# Patient Record
Sex: FEMALE | Race: WHITE | NOT HISPANIC OR LATINO | Employment: OTHER | URBAN - METROPOLITAN AREA
[De-identification: names, ages, dates, MRNs, and addresses within clinical notes are randomized per-mention and may not be internally consistent; named-entity substitution may affect disease eponyms.]

---

## 2023-02-14 PROBLEM — M96.1 FAILED BACK SYNDROME OF LUMBAR SPINE: Status: ACTIVE | Noted: 2023-02-14

## 2023-02-14 PROBLEM — R25.2 MUSCLE CRAMPING: Status: ACTIVE | Noted: 2023-02-14

## 2023-02-14 PROBLEM — E03.9 HYPOTHYROIDISM: Status: ACTIVE | Noted: 2023-02-14

## 2023-02-14 PROBLEM — M51.369 OTHER INTERVERTEBRAL DISC DEGENERATION, LUMBAR REGION: Status: ACTIVE | Noted: 2023-02-14

## 2023-02-14 PROBLEM — R81 GLUCOSURIA: Status: ACTIVE | Noted: 2023-02-14

## 2023-02-14 PROBLEM — E11.40 DIABETES MELLITUS WITH DIABETIC NEUROPATHY (MULTI): Status: ACTIVE | Noted: 2023-02-14

## 2023-02-14 PROBLEM — M79.2 NEUROPATHIC PAIN OF LOWER EXTREMITY, LEFT: Status: ACTIVE | Noted: 2023-02-14

## 2023-02-14 PROBLEM — R42 LIGHTHEADED: Status: ACTIVE | Noted: 2023-02-14

## 2023-02-14 PROBLEM — M51.36 OTHER INTERVERTEBRAL DISC DEGENERATION, LUMBAR REGION: Status: ACTIVE | Noted: 2023-02-14

## 2023-02-14 PROBLEM — K25.9 GASTRIC ULCER: Status: ACTIVE | Noted: 2023-02-14

## 2023-02-14 PROBLEM — E83.42 HYPOMAGNESEMIA: Status: ACTIVE | Noted: 2023-02-14

## 2023-02-14 PROBLEM — M79.671 FOOT PAIN, BILATERAL: Status: ACTIVE | Noted: 2023-02-14

## 2023-02-14 PROBLEM — I10 HYPERTENSION: Status: ACTIVE | Noted: 2023-02-14

## 2023-02-14 PROBLEM — L60.0 IGTN (INGROWING TOE NAIL): Status: ACTIVE | Noted: 2023-02-14

## 2023-02-14 PROBLEM — E55.9 VITAMIN D DEFICIENCY: Status: ACTIVE | Noted: 2023-02-14

## 2023-02-14 PROBLEM — M48.061 SPINAL STENOSIS, LUMBAR: Status: ACTIVE | Noted: 2023-02-14

## 2023-02-14 PROBLEM — B35.1 ONYCHOMYCOSIS: Status: ACTIVE | Noted: 2023-02-14

## 2023-02-14 PROBLEM — F41.9 ANXIETY: Status: ACTIVE | Noted: 2023-02-14

## 2023-02-14 PROBLEM — M54.50 CHRONIC LOW BACK PAIN: Status: ACTIVE | Noted: 2023-02-14

## 2023-02-14 PROBLEM — R31.29 MICROSCOPIC HEMATURIA: Status: ACTIVE | Noted: 2023-02-14

## 2023-02-14 PROBLEM — N28.9 KIDNEY DYSFUNCTION: Status: ACTIVE | Noted: 2023-02-14

## 2023-02-14 PROBLEM — M54.31 SCIATICA OF RIGHT SIDE: Status: ACTIVE | Noted: 2023-02-14

## 2023-02-14 PROBLEM — N39.3 STRESS INCONTINENCE IN FEMALE: Status: ACTIVE | Noted: 2023-02-14

## 2023-02-14 PROBLEM — I65.23 CAROTID STENOSIS, BILATERAL: Status: ACTIVE | Noted: 2023-02-14

## 2023-02-14 PROBLEM — I95.9 HYPOTENSIVE EPISODE: Status: ACTIVE | Noted: 2023-02-14

## 2023-02-14 PROBLEM — R79.89 ELEVATED LFTS: Status: ACTIVE | Noted: 2023-02-14

## 2023-02-14 PROBLEM — M54.32 SCIATIC PAIN, LEFT: Status: ACTIVE | Noted: 2023-02-14

## 2023-02-14 PROBLEM — N18.30 STAGE III CHRONIC KIDNEY DISEASE (MULTI): Status: ACTIVE | Noted: 2023-02-14

## 2023-02-14 PROBLEM — R73.9 ELEVATED BLOOD SUGAR: Status: ACTIVE | Noted: 2023-02-14

## 2023-02-14 PROBLEM — G47.00 INSOMNIA: Status: ACTIVE | Noted: 2023-02-14

## 2023-02-14 PROBLEM — M53.3 SACROILIAC JOINT DYSFUNCTION OF RIGHT SIDE: Status: ACTIVE | Noted: 2023-02-14

## 2023-02-14 PROBLEM — H81.10 VERTIGO, BENIGN POSITIONAL: Status: ACTIVE | Noted: 2023-02-14

## 2023-02-14 PROBLEM — M43.16 SPONDYLOLISTHESIS OF LUMBAR REGION: Status: ACTIVE | Noted: 2023-02-14

## 2023-02-14 PROBLEM — E78.00 HYPERCHOLESTEROLEMIA: Status: ACTIVE | Noted: 2023-02-14

## 2023-02-14 PROBLEM — D64.9 POSTOPERATIVE ANEMIA: Status: ACTIVE | Noted: 2023-02-14

## 2023-02-14 PROBLEM — M54.16 LUMBAR RADICULITIS: Status: ACTIVE | Noted: 2023-02-14

## 2023-02-14 PROBLEM — R39.9 SYMPTOMS OF URINARY TRACT INFECTION: Status: ACTIVE | Noted: 2023-02-14

## 2023-02-14 PROBLEM — E11.42 DIABETIC POLYNEUROPATHY ASSOCIATED WITH TYPE 2 DIABETES MELLITUS (MULTI): Status: ACTIVE | Noted: 2023-02-14

## 2023-02-14 PROBLEM — M48.061 FORAMINAL STENOSIS OF LUMBAR REGION: Status: ACTIVE | Noted: 2023-02-14

## 2023-02-14 PROBLEM — I83.93 VARICOSE VEINS OF LEGS: Status: ACTIVE | Noted: 2023-02-14

## 2023-02-14 PROBLEM — I87.2 CHRONIC VENOUS INSUFFICIENCY: Status: ACTIVE | Noted: 2023-02-14

## 2023-02-14 PROBLEM — H90.3 BILATERAL SENSORINEURAL HEARING LOSS: Status: ACTIVE | Noted: 2023-02-14

## 2023-02-14 PROBLEM — G89.29 CHRONIC LOW BACK PAIN: Status: ACTIVE | Noted: 2023-02-14

## 2023-02-14 PROBLEM — R53.83 FATIGUE: Status: ACTIVE | Noted: 2023-02-14

## 2023-02-14 PROBLEM — M47.816 SPONDYLOSIS OF LUMBAR REGION WITHOUT MYELOPATHY OR RADICULOPATHY: Status: ACTIVE | Noted: 2023-02-14

## 2023-02-14 PROBLEM — M79.672 FOOT PAIN, BILATERAL: Status: ACTIVE | Noted: 2023-02-14

## 2023-02-14 PROBLEM — M81.0 OSTEOPOROSIS OF VERTEBRA: Status: ACTIVE | Noted: 2023-02-14

## 2023-02-14 PROBLEM — M79.606 LOWER EXTREMITY PAIN: Status: ACTIVE | Noted: 2023-02-14

## 2023-02-14 RX ORDER — HUMAN INSULIN 100 [USP'U]/ML
INJECTION, SUSPENSION SUBCUTANEOUS
COMMUNITY
Start: 2014-03-17 | End: 2023-07-27 | Stop reason: SDUPTHER

## 2023-02-14 RX ORDER — BENAZEPRIL HYDROCHLORIDE 10 MG/1
1 TABLET ORAL DAILY
COMMUNITY
Start: 2018-02-12 | End: 2023-06-06 | Stop reason: SDUPTHER

## 2023-02-14 RX ORDER — FENOFIBRATE 54 MG/1
1 TABLET ORAL DAILY
COMMUNITY
Start: 2014-08-20

## 2023-02-14 RX ORDER — PAROXETINE 30 MG/1
1 TABLET, FILM COATED ORAL DAILY
COMMUNITY
Start: 2013-06-19 | End: 2023-07-17

## 2023-02-14 RX ORDER — GLIMEPIRIDE 4 MG/1
1 TABLET ORAL 2 TIMES DAILY
COMMUNITY
Start: 2016-07-25 | End: 2023-12-27 | Stop reason: SDUPTHER

## 2023-02-14 RX ORDER — DEXTROMETHORPHAN HYDROBROMIDE, GUAIFENESIN 5; 100 MG/5ML; MG/5ML
650 LIQUID ORAL EVERY 8 HOURS PRN
COMMUNITY
Start: 2021-09-21

## 2023-02-14 RX ORDER — MULTIVIT WITH MINERALS/HERBS
1 TABLET ORAL DAILY
COMMUNITY

## 2023-02-14 RX ORDER — BLOOD SUGAR DIAGNOSTIC
STRIP MISCELLANEOUS
COMMUNITY
Start: 2020-06-16 | End: 2024-01-18 | Stop reason: SDUPTHER

## 2023-02-14 RX ORDER — ACETAMINOPHEN 500 MG
1 TABLET ORAL DAILY
COMMUNITY
Start: 2017-08-14

## 2023-03-20 ENCOUNTER — TELEPHONE (OUTPATIENT)
Dept: PRIMARY CARE | Facility: CLINIC | Age: 78
End: 2023-03-20
Payer: COMMERCIAL

## 2023-03-20 DIAGNOSIS — M48.062 SPINAL STENOSIS OF LUMBAR REGION WITH NEUROGENIC CLAUDICATION: ICD-10-CM

## 2023-03-20 DIAGNOSIS — M54.16 LUMBAR RADICULITIS: Primary | ICD-10-CM

## 2023-03-24 NOTE — TELEPHONE ENCOUNTER
Pt called back needing referral & prior auth.   To  [facility] for MRI    [facility] [phone]    Spoke w/ pt again.  I see mri for back but she  Does say her hip & back hurt.  She was never  To any doctor at [facility].  Please add to mri  ty       Spoke w/her gave number to [facility] & she is seeing Skylar Hassan   Next week.

## 2023-04-10 ENCOUNTER — OFFICE VISIT (OUTPATIENT)
Dept: PRIMARY CARE | Facility: CLINIC | Age: 78
End: 2023-04-10
Payer: COMMERCIAL

## 2023-04-10 VITALS
DIASTOLIC BLOOD PRESSURE: 68 MMHG | WEIGHT: 174.6 LBS | TEMPERATURE: 96.9 F | HEIGHT: 63 IN | HEART RATE: 57 BPM | BODY MASS INDEX: 30.94 KG/M2 | SYSTOLIC BLOOD PRESSURE: 120 MMHG | OXYGEN SATURATION: 96 %

## 2023-04-10 DIAGNOSIS — E11.40 TYPE 2 DIABETES MELLITUS WITH DIABETIC NEUROPATHY, WITHOUT LONG-TERM CURRENT USE OF INSULIN (MULTI): Primary | ICD-10-CM

## 2023-04-10 DIAGNOSIS — M48.061 FORAMINAL STENOSIS OF LUMBAR REGION: ICD-10-CM

## 2023-04-10 LAB — HBA1C MFR BLD: 7.5 % (ref 4.2–6.5)

## 2023-04-10 PROCEDURE — 83036 HEMOGLOBIN GLYCOSYLATED A1C: CPT | Performed by: FAMILY MEDICINE

## 2023-04-10 PROCEDURE — 3074F SYST BP LT 130 MM HG: CPT | Performed by: FAMILY MEDICINE

## 2023-04-10 PROCEDURE — 1159F MED LIST DOCD IN RCRD: CPT | Performed by: FAMILY MEDICINE

## 2023-04-10 PROCEDURE — 99214 OFFICE O/P EST MOD 30 MIN: CPT | Performed by: FAMILY MEDICINE

## 2023-04-10 PROCEDURE — 3078F DIAST BP <80 MM HG: CPT | Performed by: FAMILY MEDICINE

## 2023-04-10 PROCEDURE — 1036F TOBACCO NON-USER: CPT | Performed by: FAMILY MEDICINE

## 2023-04-10 RX ORDER — NAPROXEN 500 MG/1
500 TABLET ORAL EVERY 12 HOURS PRN
Qty: 60 TABLET | Refills: 3 | Status: SHIPPED | OUTPATIENT
Start: 2023-04-10 | End: 2023-05-10

## 2023-04-10 RX ORDER — TRAMADOL HYDROCHLORIDE 50 MG/1
TABLET ORAL
COMMUNITY
End: 2023-04-10 | Stop reason: SDUPTHER

## 2023-04-10 RX ORDER — TRAMADOL HYDROCHLORIDE 50 MG/1
50 TABLET ORAL 2 TIMES DAILY PRN
Qty: 60 TABLET | Refills: 0 | Status: SHIPPED | OUTPATIENT
Start: 2023-04-10 | End: 2023-12-13

## 2023-04-10 RX ORDER — NAPROXEN 500 MG/1
500 TABLET ORAL EVERY 12 HOURS PRN
COMMUNITY
Start: 2023-03-16 | End: 2023-04-10 | Stop reason: SDUPTHER

## 2023-04-10 RX ORDER — PREDNISONE 5 MG/1
5 TABLET ORAL DAILY
Qty: 7 TABLET | Refills: 0 | Status: SHIPPED | OUTPATIENT
Start: 2023-04-10 | End: 2023-07-17

## 2023-04-10 RX ORDER — METHOCARBAMOL 750 MG/1
750 TABLET, FILM COATED ORAL DAILY PRN
Qty: 30 TABLET | Refills: 1 | Status: SHIPPED | OUTPATIENT
Start: 2023-04-10 | End: 2023-07-17

## 2023-04-10 RX ORDER — PREDNISONE 5 MG/1
5 TABLET ORAL DAILY
COMMUNITY
Start: 2023-04-05 | End: 2023-04-10 | Stop reason: SDUPTHER

## 2023-04-10 RX ORDER — METHOCARBAMOL 750 MG/1
750 TABLET, FILM COATED ORAL 4 TIMES DAILY
COMMUNITY
End: 2023-04-10 | Stop reason: SDUPTHER

## 2023-04-10 ASSESSMENT — PAIN SCALES - GENERAL: PAINLEVEL: 6

## 2023-04-10 NOTE — PROGRESS NOTES
"Subjective   Patient ID: Skylar Parra is a 77 y.o. female who presents for Diabetes (Check sugar and review medications.).    HPI   Was unable to do PT due to pain.  Has MRI scheduled tomorrow.  Continues to have low back and leg pain  Blood sugars doing well.  They have been getting down to referral around 7.  She has drastically improved her diet and it is her weight down 50 pounds.  Review of Systems    Objective   /68 (BP Location: Left arm, Patient Position: Sitting, BP Cuff Size: Adult)   Pulse 57   Temp 36.1 °C (96.9 °F) (Temporal)   Ht 1.6 m (5' 3\")   Wt 79.2 kg (174 lb 9.6 oz)   SpO2 96%   BMI 30.93 kg/m²     Physical Exam  Alert, pleasant and in no acute distress.  Heart: Regular rate and rhythm without murmur  Lungs: Clear to auscultation  Lower extremities: No edema  Assessment/Plan   Problem List Items Addressed This Visit          Nervous    Diabetes mellitus with diabetic neuropathy (CMS/HCC) - Primary    Relevant Orders    POCT Glycosylated Hemoglobin (HGB A1C) docked device    Foraminal stenosis of lumbar region    Relevant Medications    methocarbamol (Robaxin) 750 mg tablet    traMADol (Ultram) 50 mg tablet    naproxen (Naprosyn) 500 mg tablet    predniSONE (Deltasone) 5 mg tablet     Patient has been throwing up the last month or 2.  She has a lot of low back and leg pain.  She has bounced around from doctors or to the emergency rooms.  She has an MRI ordered tomorrow.  We will keep current medication regimen which has been somewhat helpful.  Advised her to be careful for dizziness and drowsiness.  She is using each of the medication sparingly.  OARRS report was reviewed.  Referral to pain management.  Patient has seen them in the past and I recommended they see her within the next week so they can review her MRI and look at further treatment  For diabetes, advised her to continue weaning her insulin to avoid hypoglycemia.  Advised her to carry some sweet food products with her " regularly in case her sugars drop.  Commended her on her healthy diet  Follow-up in 3 months

## 2023-06-22 ENCOUNTER — TELEPHONE (OUTPATIENT)
Dept: PRIMARY CARE | Facility: CLINIC | Age: 78
End: 2023-06-22
Payer: COMMERCIAL

## 2023-06-22 DIAGNOSIS — F41.9 ANXIETY: Primary | ICD-10-CM

## 2023-06-26 RX ORDER — ESCITALOPRAM OXALATE 10 MG/1
10 TABLET ORAL DAILY
Qty: 30 TABLET | Refills: 5 | Status: SHIPPED | OUTPATIENT
Start: 2023-06-26 | End: 2023-07-19

## 2023-07-17 ENCOUNTER — OFFICE VISIT (OUTPATIENT)
Dept: PRIMARY CARE | Facility: CLINIC | Age: 78
End: 2023-07-17
Payer: COMMERCIAL

## 2023-07-17 ENCOUNTER — TELEPHONE (OUTPATIENT)
Dept: PRIMARY CARE | Facility: CLINIC | Age: 78
End: 2023-07-17

## 2023-07-17 VITALS
SYSTOLIC BLOOD PRESSURE: 130 MMHG | DIASTOLIC BLOOD PRESSURE: 76 MMHG | WEIGHT: 164.2 LBS | TEMPERATURE: 98 F | BODY MASS INDEX: 29.09 KG/M2 | HEIGHT: 63 IN

## 2023-07-17 DIAGNOSIS — E11.40 TYPE 2 DIABETES MELLITUS WITH DIABETIC NEUROPATHY, WITHOUT LONG-TERM CURRENT USE OF INSULIN (MULTI): Primary | ICD-10-CM

## 2023-07-17 DIAGNOSIS — S50.812A ABRASION OF LEFT FOREARM, INITIAL ENCOUNTER: ICD-10-CM

## 2023-07-17 DIAGNOSIS — R29.6 MULTIPLE FALLS: ICD-10-CM

## 2023-07-17 PROBLEM — M96.1 POSTLAMINECTOMY SYNDROME OF LUMBAR REGION: Status: ACTIVE | Noted: 2023-07-17

## 2023-07-17 PROBLEM — N18.2 STAGE 2 CHRONIC KIDNEY DISEASE: Status: ACTIVE | Noted: 2023-07-17

## 2023-07-17 PROBLEM — E16.2 HYPOGLYCEMIA: Status: ACTIVE | Noted: 2019-08-27

## 2023-07-17 PROBLEM — I10 ESSENTIAL HYPERTENSION: Status: ACTIVE | Noted: 2023-07-17

## 2023-07-17 PROBLEM — Z86.39 HISTORY OF HYPERCHOLESTEROLEMIA: Status: ACTIVE | Noted: 2023-07-17

## 2023-07-17 PROBLEM — E11.9 TYPE 2 DIABETES MELLITUS (MULTI): Status: ACTIVE | Noted: 2023-07-17

## 2023-07-17 PROCEDURE — 1125F AMNT PAIN NOTED PAIN PRSNT: CPT | Performed by: FAMILY MEDICINE

## 2023-07-17 PROCEDURE — 3075F SYST BP GE 130 - 139MM HG: CPT | Performed by: FAMILY MEDICINE

## 2023-07-17 PROCEDURE — 1036F TOBACCO NON-USER: CPT | Performed by: FAMILY MEDICINE

## 2023-07-17 PROCEDURE — 1159F MED LIST DOCD IN RCRD: CPT | Performed by: FAMILY MEDICINE

## 2023-07-17 PROCEDURE — 3078F DIAST BP <80 MM HG: CPT | Performed by: FAMILY MEDICINE

## 2023-07-17 PROCEDURE — 99214 OFFICE O/P EST MOD 30 MIN: CPT | Performed by: FAMILY MEDICINE

## 2023-07-17 RX ORDER — INSULIN GLARGINE 100 [IU]/ML
20-30 INJECTION, SOLUTION SUBCUTANEOUS NIGHTLY
Qty: 10 ML | Refills: 11 | Status: SHIPPED | OUTPATIENT
Start: 2023-07-17 | End: 2023-07-18

## 2023-07-17 RX ORDER — CALCIUM CARBONATE 300MG(750)
400 TABLET,CHEWABLE ORAL DAILY
COMMUNITY

## 2023-07-17 ASSESSMENT — PAIN SCALES - GENERAL: PAINLEVEL: 4

## 2023-07-17 NOTE — TELEPHONE ENCOUNTER
She will check about levemir but is very frustrated with these insurances.  She will continue taking what she is on.  Ty

## 2023-07-17 NOTE — TELEPHONE ENCOUNTER
She said they will accept the levemir for a month at a time.  Could you please prescribe this med to [facility] [phone]  Not the mail order.  Please advise

## 2023-07-17 NOTE — TELEPHONE ENCOUNTER
Pts insurance will not cover her insulin glargine (lantus) 100 units.    Is there anything else she can take?  She has [facility].  Please advise

## 2023-07-18 RX ORDER — INSULIN DETEMIR 100 [IU]/ML
20-30 INJECTION, SOLUTION SUBCUTANEOUS NIGHTLY
Qty: 10 ML | Refills: 11 | Status: SHIPPED | OUTPATIENT
Start: 2023-07-18 | End: 2023-07-27 | Stop reason: SDUPTHER

## 2023-07-18 NOTE — PROGRESS NOTES
"Subjective   Patient ID: Skylar Parra is a 78 y.o. female who presents for Follow-up (3 month follow up and review medications. ), Leg Pain (Leg pain and difficulty to walk. Falling concerns. ), and Wound Infection (Wound infected on left arm. ).    HPI   As noted in chief complaint.  Patient has had a lot of musculoskeletal problems along with falls.  She has an abrasion on her left arm.  Blood sugars have been a bit sporadic.  Review of Systems    Objective   /76 (BP Location: Left arm, Patient Position: Sitting, BP Cuff Size: Adult)   Temp 36.7 °C (98 °F)   Ht 1.6 m (5' 3\")   Wt 74.5 kg (164 lb 3.2 oz)   BMI 29.09 kg/m²     Physical Exam  Alert, pleasant and in no acute distress.  Heart: Regular rate and rhythm without murmur  Lungs: Clear to auscultation  Lower extremities: No edema  Left forearm: Rectangular shaped 1 inch superficial abrasion.  No sign of infection.  There is no pus or erythema.  Assessment/Plan   Problem List Items Addressed This Visit          Endocrine/Metabolic    Type 2 diabetes mellitus (CMS/HCC) - Primary    Relevant Medications    insulin glargine (Lantus) 100 unit/mL injection     Other Visit Diagnoses       Abrasion of left forearm, initial encounter        Multiple falls            Patient here with multiple falls.  She is not interested in physical therapy at this time.  We discussed continuing specialty follow-up for her pain.  For her diabetes, we are going to start her on long-acting insulin to try to help smooth her numbers.  We will lower her nonfluent 70/30 to 15 units twice a day and have her start with 20 units of insulin glargine daily.  Abrasion of forearm: Advised patient to clean her wound daily with mild soapy water.  She is to apply some Vaseline along with a nonadherent dressing.  Dressing of the wound was undertaken in the office to explain and teach her how to care for it.  Follow-up in 3 months, sooner if having problems.       "

## 2023-07-19 DIAGNOSIS — F41.9 ANXIETY: ICD-10-CM

## 2023-07-19 RX ORDER — NAPROXEN 500 MG/1
TABLET ORAL
COMMUNITY
Start: 2023-07-18 | End: 2023-12-13

## 2023-07-19 RX ORDER — ESCITALOPRAM OXALATE 10 MG/1
TABLET ORAL
Qty: 30 TABLET | Refills: 5 | Status: SHIPPED | OUTPATIENT
Start: 2023-07-19 | End: 2023-12-18

## 2023-07-21 ENCOUNTER — TELEPHONE (OUTPATIENT)
Dept: PRIMARY CARE | Facility: CLINIC | Age: 78
End: 2023-07-21
Payer: MEDICAID

## 2023-07-21 NOTE — TELEPHONE ENCOUNTER
Pt has bed sore-she is coming in for annual 8/9.  Could she be squeezed in  Monday?  Cannot get here between the 25-29.  Please advise.

## 2023-07-24 ENCOUNTER — OFFICE VISIT (OUTPATIENT)
Dept: PRIMARY CARE | Facility: CLINIC | Age: 78
End: 2023-07-24
Payer: COMMERCIAL

## 2023-07-24 VITALS
BODY MASS INDEX: 29.62 KG/M2 | DIASTOLIC BLOOD PRESSURE: 68 MMHG | WEIGHT: 167.2 LBS | HEIGHT: 63 IN | TEMPERATURE: 97.1 F | HEART RATE: 113 BPM | OXYGEN SATURATION: 93 % | SYSTOLIC BLOOD PRESSURE: 118 MMHG

## 2023-07-24 DIAGNOSIS — L89.90: ICD-10-CM

## 2023-07-24 DIAGNOSIS — Z79.4 TYPE 2 DIABETES MELLITUS WITHOUT COMPLICATION, WITH LONG-TERM CURRENT USE OF INSULIN (MULTI): Primary | ICD-10-CM

## 2023-07-24 DIAGNOSIS — L98.499: ICD-10-CM

## 2023-07-24 DIAGNOSIS — E11.9 TYPE 2 DIABETES MELLITUS WITHOUT COMPLICATION, WITH LONG-TERM CURRENT USE OF INSULIN (MULTI): Primary | ICD-10-CM

## 2023-07-24 PROBLEM — I95.9 HYPOTENSIVE EPISODE: Status: RESOLVED | Noted: 2023-02-14 | Resolved: 2023-07-24

## 2023-07-24 PROBLEM — I10 HYPERTENSION: Status: RESOLVED | Noted: 2023-02-14 | Resolved: 2023-07-24

## 2023-07-24 PROCEDURE — 1125F AMNT PAIN NOTED PAIN PRSNT: CPT | Performed by: FAMILY MEDICINE

## 2023-07-24 PROCEDURE — 3074F SYST BP LT 130 MM HG: CPT | Performed by: FAMILY MEDICINE

## 2023-07-24 PROCEDURE — 99214 OFFICE O/P EST MOD 30 MIN: CPT | Performed by: FAMILY MEDICINE

## 2023-07-24 PROCEDURE — 3078F DIAST BP <80 MM HG: CPT | Performed by: FAMILY MEDICINE

## 2023-07-24 PROCEDURE — 1159F MED LIST DOCD IN RCRD: CPT | Performed by: FAMILY MEDICINE

## 2023-07-24 PROCEDURE — 1036F TOBACCO NON-USER: CPT | Performed by: FAMILY MEDICINE

## 2023-07-24 ASSESSMENT — PAIN SCALES - GENERAL: PAINLEVEL: 6

## 2023-07-24 NOTE — PROGRESS NOTES
"Subjective   Patient ID: Skylar Parra is a 78 y.o. female who presents for Follow-up (Sore on tailbone for 1 week. ) and Wound Check (Check wound on left arm).    HPI   Patient here today with her sister.  She is planning to go into an assisted living facility this week.  They need forms completed.  Patient also has 2 wounds she would like looked at.  One on her left forearm and 1 on her gluteal region.  Patient's blood sugars have been doing better on the Levemir.  Advised patient to cut back on her Novolin since her sugars have been dipping lower.  Review of Systems    Objective   /68 (BP Location: Left arm, Patient Position: Sitting, BP Cuff Size: Adult)   Pulse (!) 113   Temp 36.2 °C (97.1 °F) (Temporal)   Ht 1.6 m (5' 3\")   Wt 75.8 kg (167 lb 3.2 oz)   SpO2 93%   BMI 29.62 kg/m²     Physical Exam  Alert, pleasant and in no acute distress.  Heart: Regular rate and rhythm without murmur  Lungs: Clear to auscultation  Lower extremities: Trace edema.  Left gluteal cleft with 1.5 inch area of superficial ulceration.  Appears clean and without infection.  Left forearm: 1 inch area of denuded skin appears to be healing.  No sign of infection.  Assessment/Plan   Problem List Items Addressed This Visit          Endocrine/Metabolic    Type 2 diabetes mellitus (CMS/HCC) - Primary     Other Visit Diagnoses       Superficial ulcer (CMS/HCC)        Superficial pressure ulcer            Patient here for follow-up.  She has 2 ulcers.  The one on her arm looks like it is healing well.  Unfortunately, she has been sleeping in a chair and this is causing an ulcer on her gluteal region.  This 1 appears to be clean and dry.  We will have a wound care nurse start to help her manage at the nursing home.  She is to continue on her Levemir but we will have her cut back on her novel and by 10 units to 20 units twice daily.  Follow-up in 1 month.  Forms completed for assisted living       "

## 2023-07-25 ENCOUNTER — TELEPHONE (OUTPATIENT)
Dept: PRIMARY CARE | Facility: CLINIC | Age: 78
End: 2023-07-25
Payer: MEDICAID

## 2023-07-25 NOTE — TELEPHONE ENCOUNTER
[facility] needed referral for pt & ot & wound nurse consult for pt.  She just had visit recently.  [id number]-[phone]  [id number]-[phone]  Wants to move in on Thursday.  Please advise  Ty

## 2023-07-25 NOTE — TELEPHONE ENCOUNTER
[facility] needs detailed list of meds/quantity taken-often.  She was  Just in yesterday.  [id number]-[phone]  She will be ok with just a call

## 2023-07-27 ENCOUNTER — TELEPHONE (OUTPATIENT)
Dept: PRIMARY CARE | Facility: CLINIC | Age: 78
End: 2023-07-27
Payer: MEDICAID

## 2023-07-27 DIAGNOSIS — E11.40 TYPE 2 DIABETES MELLITUS WITH DIABETIC NEUROPATHY, WITHOUT LONG-TERM CURRENT USE OF INSULIN (MULTI): ICD-10-CM

## 2023-07-27 RX ORDER — HUMAN INSULIN 100 [USP'U]/ML
15 INJECTION, SUSPENSION SUBCUTANEOUS
Qty: 10 ML | Refills: 3 | Status: SHIPPED | OUTPATIENT
Start: 2023-07-27 | End: 2023-12-13 | Stop reason: SDUPTHER

## 2023-07-27 RX ORDER — INSULIN DETEMIR 100 [IU]/ML
20 INJECTION, SOLUTION SUBCUTANEOUS NIGHTLY
Qty: 10 ML | Refills: 11 | Status: SHIPPED | OUTPATIENT
Start: 2023-07-27 | End: 2023-08-23 | Stop reason: SDUPTHER

## 2023-07-27 NOTE — TELEPHONE ENCOUNTER
[facility] called asking for calcification, which insulin is the patient supposed  to take Lotensin or Novolin?  Also the directions would need to be changed because they cannot do 20 - 30 units. There needs to be an exact number or sliding scale

## 2023-07-27 NOTE — TELEPHONE ENCOUNTER
Pt called and states that she is going into assistant living today and would like to thank you for everything you have done for her.

## 2023-08-09 ENCOUNTER — APPOINTMENT (OUTPATIENT)
Dept: PRIMARY CARE | Facility: CLINIC | Age: 78
End: 2023-08-09
Payer: MEDICAID

## 2023-08-22 DIAGNOSIS — E11.40 TYPE 2 DIABETES MELLITUS WITH DIABETIC NEUROPATHY, WITHOUT LONG-TERM CURRENT USE OF INSULIN (MULTI): ICD-10-CM

## 2023-08-22 NOTE — TELEPHONE ENCOUNTER
Pt. Called & said dr. Pavon cancelled her rx for levemir 100 units ml once daily to  [phone]  Can you refill please?  She is out  Ty     [___] : [unfilled]

## 2023-08-23 RX ORDER — INSULIN DETEMIR 100 [IU]/ML
20 INJECTION, SOLUTION SUBCUTANEOUS NIGHTLY
Qty: 10 ML | Refills: 11 | Status: SHIPPED | OUTPATIENT
Start: 2023-08-23 | End: 2023-12-13 | Stop reason: ALTCHOICE

## 2023-08-28 ENCOUNTER — APPOINTMENT (OUTPATIENT)
Dept: PRIMARY CARE | Facility: CLINIC | Age: 78
End: 2023-08-28
Payer: MEDICAID

## 2023-09-07 NOTE — TELEPHONE ENCOUNTER
Called pt and left vm regarding rx sent to pharmacy.    You can access the FollowMyHealth Patient Portal offered by [facility] by registering at the following website: [email or web address]. By joining [facility]’s FollowMyHealth portal, you will also be able to view your health information using other applications (apps) compatible with our system.

## 2023-10-09 ENCOUNTER — TELEPHONE (OUTPATIENT)
Dept: PRIMARY CARE | Facility: CLINIC | Age: 78
End: 2023-10-09
Payer: MEDICAID

## 2023-10-09 NOTE — TELEPHONE ENCOUNTER
Spoke with Colin and he states that the pt is taking 15 units of the Novolin twice daily and he just wants clarification about you wanting the pt to take 70 units twice daily. They feel that,that seems a little high from what she is taking now

## 2023-10-09 NOTE — TELEPHONE ENCOUNTER
[facility] called and states that the pt sugar went to 29-30 yesterday and they need new insulin orders done and would like to know if a nurse can do them due to they believe that the pt is not doing it herself. Pharmacy is on file. If you need to give them a call the phone number is [phone] ask for bill

## 2023-12-13 ENCOUNTER — OFFICE VISIT (OUTPATIENT)
Dept: PRIMARY CARE | Facility: CLINIC | Age: 78
End: 2023-12-13
Payer: COMMERCIAL

## 2023-12-13 VITALS
TEMPERATURE: 96.9 F | SYSTOLIC BLOOD PRESSURE: 124 MMHG | HEART RATE: 74 BPM | OXYGEN SATURATION: 98 % | DIASTOLIC BLOOD PRESSURE: 72 MMHG | WEIGHT: 170.4 LBS | BODY MASS INDEX: 30.19 KG/M2

## 2023-12-13 DIAGNOSIS — E03.9 HYPOTHYROIDISM, UNSPECIFIED TYPE: ICD-10-CM

## 2023-12-13 DIAGNOSIS — E11.40 TYPE 2 DIABETES MELLITUS WITH DIABETIC NEUROPATHY, WITHOUT LONG-TERM CURRENT USE OF INSULIN (MULTI): ICD-10-CM

## 2023-12-13 DIAGNOSIS — Z00.00 ANNUAL PHYSICAL EXAM: Primary | ICD-10-CM

## 2023-12-13 DIAGNOSIS — Z11.59 ENCOUNTER FOR HEPATITIS C SCREENING TEST FOR LOW RISK PATIENT: ICD-10-CM

## 2023-12-13 DIAGNOSIS — Z13.820 OSTEOPOROSIS SCREENING: ICD-10-CM

## 2023-12-13 DIAGNOSIS — S22.000A WEDGE COMPRESSION FRACTURE OF UNSPECIFIED THORACIC VERTEBRA, INITIAL ENCOUNTER FOR CLOSED FRACTURE (MULTI): ICD-10-CM

## 2023-12-13 DIAGNOSIS — E78.00 HYPERCHOLESTEROLEMIA: ICD-10-CM

## 2023-12-13 LAB
ALBUMIN SERPL BCP-MCNC: 4.4 G/DL (ref 3.4–5)
ALP SERPL-CCNC: 48 U/L (ref 33–136)
ALT SERPL W P-5'-P-CCNC: 15 U/L (ref 7–45)
ANION GAP SERPL CALC-SCNC: 16 MMOL/L (ref 10–20)
AST SERPL W P-5'-P-CCNC: 20 U/L (ref 9–39)
BILIRUB SERPL-MCNC: 0.3 MG/DL (ref 0–1.2)
BUN SERPL-MCNC: 26 MG/DL (ref 6–23)
CALCIUM SERPL-MCNC: 10.1 MG/DL (ref 8.6–10.6)
CHLORIDE SERPL-SCNC: 96 MMOL/L (ref 98–107)
CHOLEST SERPL-MCNC: 201 MG/DL (ref 0–199)
CHOLESTEROL/HDL RATIO: 3.3
CO2 SERPL-SCNC: 27 MMOL/L (ref 21–32)
CREAT SERPL-MCNC: 1.13 MG/DL (ref 0.5–1.05)
ERYTHROCYTE [DISTWIDTH] IN BLOOD BY AUTOMATED COUNT: 13.3 % (ref 11.5–14.5)
EST. AVERAGE GLUCOSE BLD GHB EST-MCNC: 203 MG/DL
GFR SERPL CREATININE-BSD FRML MDRD: 50 ML/MIN/1.73M*2
GLUCOSE SERPL-MCNC: 329 MG/DL (ref 74–99)
HBA1C MFR BLD: 8.7 %
HCT VFR BLD AUTO: 40.6 % (ref 36–46)
HCV AB SER QL: NONREACTIVE
HDLC SERPL-MCNC: 61.2 MG/DL
HGB BLD-MCNC: 12.9 G/DL (ref 12–16)
LDLC SERPL CALC-MCNC: 117 MG/DL
MCH RBC QN AUTO: 29 PG (ref 26–34)
MCHC RBC AUTO-ENTMCNC: 31.8 G/DL (ref 32–36)
MCV RBC AUTO: 91 FL (ref 80–100)
NON HDL CHOLESTEROL: 140 MG/DL (ref 0–149)
NRBC BLD-RTO: 0 /100 WBCS (ref 0–0)
PLATELET # BLD AUTO: 305 X10*3/UL (ref 150–450)
POTASSIUM SERPL-SCNC: 5.1 MMOL/L (ref 3.5–5.3)
PROT SERPL-MCNC: 6.6 G/DL (ref 6.4–8.2)
RBC # BLD AUTO: 4.45 X10*6/UL (ref 4–5.2)
SODIUM SERPL-SCNC: 134 MMOL/L (ref 136–145)
TRIGL SERPL-MCNC: 116 MG/DL (ref 0–149)
TSH SERPL-ACNC: 5.09 MIU/L (ref 0.44–3.98)
VLDL: 23 MG/DL (ref 0–40)
WBC # BLD AUTO: 11.4 X10*3/UL (ref 4.4–11.3)

## 2023-12-13 PROCEDURE — 84439 ASSAY OF FREE THYROXINE: CPT

## 2023-12-13 PROCEDURE — 86803 HEPATITIS C AB TEST: CPT

## 2023-12-13 PROCEDURE — 84443 ASSAY THYROID STIM HORMONE: CPT

## 2023-12-13 PROCEDURE — 83036 HEMOGLOBIN GLYCOSYLATED A1C: CPT

## 2023-12-13 PROCEDURE — 1036F TOBACCO NON-USER: CPT | Performed by: FAMILY MEDICINE

## 2023-12-13 PROCEDURE — 85027 COMPLETE CBC AUTOMATED: CPT

## 2023-12-13 PROCEDURE — 1126F AMNT PAIN NOTED NONE PRSNT: CPT | Performed by: FAMILY MEDICINE

## 2023-12-13 PROCEDURE — G0439 PPPS, SUBSEQ VISIT: HCPCS | Performed by: FAMILY MEDICINE

## 2023-12-13 PROCEDURE — 1159F MED LIST DOCD IN RCRD: CPT | Performed by: FAMILY MEDICINE

## 2023-12-13 PROCEDURE — 36415 COLL VENOUS BLD VENIPUNCTURE: CPT

## 2023-12-13 PROCEDURE — 99213 OFFICE O/P EST LOW 20 MIN: CPT | Performed by: FAMILY MEDICINE

## 2023-12-13 PROCEDURE — 80061 LIPID PANEL: CPT

## 2023-12-13 PROCEDURE — 1170F FXNL STATUS ASSESSED: CPT | Performed by: FAMILY MEDICINE

## 2023-12-13 PROCEDURE — 80053 COMPREHEN METABOLIC PANEL: CPT

## 2023-12-13 PROCEDURE — 1160F RVW MEDS BY RX/DR IN RCRD: CPT | Performed by: FAMILY MEDICINE

## 2023-12-13 PROCEDURE — 3074F SYST BP LT 130 MM HG: CPT | Performed by: FAMILY MEDICINE

## 2023-12-13 PROCEDURE — 3078F DIAST BP <80 MM HG: CPT | Performed by: FAMILY MEDICINE

## 2023-12-13 RX ORDER — GUAIFENESIN 100 MG/5ML
200 SOLUTION ORAL 3 TIMES DAILY PRN
COMMUNITY

## 2023-12-13 RX ORDER — HUMAN INSULIN 100 [USP'U]/ML
INJECTION, SUSPENSION SUBCUTANEOUS
Qty: 30 ML | Refills: 3 | Status: SHIPPED | OUTPATIENT
Start: 2023-12-13 | End: 2023-12-15 | Stop reason: DRUGHIGH

## 2023-12-13 RX ORDER — DIPHENHYDRAMINE HCL 25 MG
25 TABLET ORAL NIGHTLY PRN
COMMUNITY

## 2023-12-13 ASSESSMENT — PATIENT HEALTH QUESTIONNAIRE - PHQ9
2. FEELING DOWN, DEPRESSED OR HOPELESS: NOT AT ALL
1. LITTLE INTEREST OR PLEASURE IN DOING THINGS: NOT AT ALL
SUM OF ALL RESPONSES TO PHQ9 QUESTIONS 1 AND 2: 0

## 2023-12-13 ASSESSMENT — ACTIVITIES OF DAILY LIVING (ADL)
GROCERY_SHOPPING: NEEDS ASSISTANCE
MANAGING_FINANCES: NEEDS ASSISTANCE
TAKING_MEDICATION: NEEDS ASSISTANCE
DRESSING: INDEPENDENT
DOING_HOUSEWORK: TOTAL CARE
BATHING: INDEPENDENT

## 2023-12-13 ASSESSMENT — ENCOUNTER SYMPTOMS
OCCASIONAL FEELINGS OF UNSTEADINESS: 1
LOSS OF SENSATION IN FEET: 0
DEPRESSION: 0

## 2023-12-13 ASSESSMENT — PAIN SCALES - GENERAL: PAINLEVEL: 0-NO PAIN

## 2023-12-13 NOTE — PROGRESS NOTES
Subjective   Patient ID: Skylar Parra is a 78 y.o. female who presents for No chief complaint on file..    HPI   Patient here for checkup and Medicare wellness.  Blood sugar logs show somewhat sporadic readings.  Ranges from approximately 100-250.     Review of Systems    Objective   /72 (BP Location: Left arm, Patient Position: Sitting, BP Cuff Size: Adult)   Pulse 74   Temp 36.1 °C (96.9 °F) (Temporal)   Wt 77.3 kg (170 lb 6.4 oz)   SpO2 98%   BMI 30.19 kg/m²     Physical Exam  Alert, pleasant and in no acute distress.  Heart: Regular rate and rhythm without murmur  Lungs: Clear to auscultation  Lower extremities: No edema    Assessment/Plan   Problem List Items Addressed This Visit             ICD-10-CM       Cardiac and Vasculature    Hypercholesterolemia E78.00    Relevant Orders    Lipid Panel       Endocrine/Metabolic    Hypothyroidism E03.9    Relevant Orders    TSH with reflex to Free T4 if abnormal    Type 2 diabetes mellitus (CMS/[facility]) E11.9    Relevant Medications    insulin NPH and regular human (NovoLIN 70/30 U-100 Insulin) 100 unit/mL (70-30) injection    Other Relevant Orders    Comprehensive Metabolic Panel    CBC    Lipid Panel    Hemoglobin A1c     Other Visit Diagnoses         Codes    Annual physical exam    -  Primary Z00.00    Encounter for hepatitis C screening test for low risk patient     Z11.59    Relevant Orders    Hepatitis C antibody    Osteoporosis screening     Z13.820    Relevant Orders    XR DEXA bone density    Wedge compression fracture of unspecified thoracic vertebra, initial encounter for closed fracture (CMS/[facility])     S22.000A    Relevant Orders    XR DEXA bone density          1.  Health maintenance: Care gaps addressed  2.  Type 2 diabetes mellitus: Fair control.  We will bump up her morning insulin by 5 units.  Await hemoglobin A1c.  3.  Hypercholesterolemia: Lipid panel  4.  Hypothyroidism: Recheck thyroid level

## 2023-12-14 LAB — T4 FREE SERPL-MCNC: 0.86 NG/DL (ref 0.78–1.48)

## 2023-12-15 DIAGNOSIS — E11.40 TYPE 2 DIABETES MELLITUS WITH DIABETIC NEUROPATHY, WITHOUT LONG-TERM CURRENT USE OF INSULIN (MULTI): ICD-10-CM

## 2023-12-15 RX ORDER — HUMAN INSULIN 100 [USP'U]/ML
INJECTION, SUSPENSION SUBCUTANEOUS
Qty: 30 ML | Refills: 3 | Status: SHIPPED | OUTPATIENT
Start: 2023-12-15

## 2023-12-15 RX ORDER — INSULIN DETEMIR 100 [IU]/ML
20 INJECTION, SOLUTION SUBCUTANEOUS NIGHTLY
Qty: 10 ML | Refills: 11 | Status: SHIPPED | OUTPATIENT
Start: 2023-12-15 | End: 2024-01-22 | Stop reason: SDUPTHER

## 2023-12-15 RX ORDER — HUMAN INSULIN 100 [USP'U]/ML
INJECTION, SUSPENSION SUBCUTANEOUS
Qty: 30 ML | Refills: 3 | Status: SHIPPED | OUTPATIENT
Start: 2023-12-15 | End: 2023-12-15 | Stop reason: SDUPTHER

## 2023-12-15 NOTE — PROGRESS NOTES
Spoke with nurse at [facility].  Levemir insulin at 20 units nightly  Novolin 70/30 insulin at 15 units twice daily

## 2023-12-15 NOTE — LETTER
December 15, 2023     Brennon Sullivan DO  [address]  [address]  [place] [facility] [address]    Patient: Skylar Parra   YOB: 1945   Date of Visit: 12/15/2023       [facility] caret[facility],    Ms. Skylar Parra will now be on 20 units of long acting insulin (Levemir) nightly along with the 70/30 insulin (Novolin) at 15 units twice daily.  If you have any questions please feel free to contact my office.           Sincerely,     Brennon Sullivan DO      CC: No Recipients  ______________________________________________________________________________________

## 2023-12-18 DIAGNOSIS — F41.9 ANXIETY: ICD-10-CM

## 2023-12-18 RX ORDER — ESCITALOPRAM OXALATE 10 MG/1
10 TABLET ORAL DAILY
Qty: 90 TABLET | Refills: 3 | Status: SHIPPED | OUTPATIENT
Start: 2023-12-18 | End: 2024-12-17

## 2023-12-27 DIAGNOSIS — E11.40 TYPE 2 DIABETES MELLITUS WITH DIABETIC NEUROPATHY, WITHOUT LONG-TERM CURRENT USE OF INSULIN (MULTI): Primary | ICD-10-CM

## 2023-12-27 RX ORDER — GLIMEPIRIDE 4 MG/1
4 TABLET ORAL 2 TIMES DAILY
Qty: 180 TABLET | Refills: 3 | Status: SHIPPED | OUTPATIENT
Start: 2023-12-27

## 2024-01-17 ENCOUNTER — APPOINTMENT (OUTPATIENT)
Dept: PRIMARY CARE | Facility: CLINIC | Age: 79
End: 2024-01-17
Payer: MEDICAID

## 2024-01-18 DIAGNOSIS — E11.40 TYPE 2 DIABETES MELLITUS WITH DIABETIC NEUROPATHY, WITHOUT LONG-TERM CURRENT USE OF INSULIN (MULTI): Primary | ICD-10-CM

## 2024-01-18 RX ORDER — BLOOD SUGAR DIAGNOSTIC
STRIP MISCELLANEOUS
Qty: 200 STRIP | Refills: 3 | Status: SHIPPED | OUTPATIENT
Start: 2024-01-18

## 2024-01-22 DIAGNOSIS — E11.40 TYPE 2 DIABETES MELLITUS WITH DIABETIC NEUROPATHY, WITHOUT LONG-TERM CURRENT USE OF INSULIN (MULTI): ICD-10-CM

## 2024-01-22 RX ORDER — INSULIN DETEMIR 100 [IU]/ML
20 INJECTION, SOLUTION SUBCUTANEOUS NIGHTLY
Qty: 30 ML | Refills: 3 | Status: SHIPPED | OUTPATIENT
Start: 2024-01-22 | End: 2025-01-21

## 2024-01-22 NOTE — TELEPHONE ENCOUNTER
Pt is requesting refill for insulin detemir (Levemir U-100 Insulin) 100 unit/mL injection to be sent to pharmacy.

## 2024-02-13 ENCOUNTER — HOSPITAL ENCOUNTER (OUTPATIENT)
Dept: RADIOLOGY | Facility: CLINIC | Age: 79
Discharge: HOME | End: 2024-02-13
Payer: COMMERCIAL

## 2024-02-13 DIAGNOSIS — Z13.820 OSTEOPOROSIS SCREENING: ICD-10-CM

## 2024-02-13 DIAGNOSIS — S22.000A WEDGE COMPRESSION FRACTURE OF UNSPECIFIED THORACIC VERTEBRA, INITIAL ENCOUNTER FOR CLOSED FRACTURE (MULTI): ICD-10-CM

## 2024-02-13 PROCEDURE — 77080 DXA BONE DENSITY AXIAL: CPT

## 2024-02-13 PROCEDURE — 77080 DXA BONE DENSITY AXIAL: CPT | Performed by: STUDENT IN AN ORGANIZED HEALTH CARE EDUCATION/TRAINING PROGRAM

## 2024-02-27 ENCOUNTER — TELEPHONE (OUTPATIENT)
Dept: PRIMARY CARE | Facility: CLINIC | Age: 79
End: 2024-02-27
Payer: MEDICAID

## 2024-02-27 NOTE — TELEPHONE ENCOUNTER
[facility] and states that the pt is running low on her Levemir and her insurance will not cover it but they are sending over insulins that they do

## 2024-03-04 ENCOUNTER — TELEPHONE (OUTPATIENT)
Dept: PRIMARY CARE | Facility: CLINIC | Age: 79
End: 2024-03-04
Payer: COMMERCIAL

## 2024-03-04 ENCOUNTER — HOSPITAL ENCOUNTER (EMERGENCY)
Facility: HOSPITAL | Age: 79
Discharge: HOME | End: 2024-03-04
Attending: EMERGENCY MEDICINE
Payer: COMMERCIAL

## 2024-03-04 ENCOUNTER — APPOINTMENT (OUTPATIENT)
Dept: CARDIOLOGY | Facility: HOSPITAL | Age: 79
End: 2024-03-04
Payer: COMMERCIAL

## 2024-03-04 VITALS
RESPIRATION RATE: 16 BRPM | HEIGHT: 63 IN | SYSTOLIC BLOOD PRESSURE: 149 MMHG | TEMPERATURE: 98.6 F | DIASTOLIC BLOOD PRESSURE: 69 MMHG | HEART RATE: 68 BPM | OXYGEN SATURATION: 100 % | WEIGHT: 171.3 LBS | BODY MASS INDEX: 30.35 KG/M2

## 2024-03-04 DIAGNOSIS — E11.40 TYPE 2 DIABETES MELLITUS WITH DIABETIC NEUROPATHY, WITHOUT LONG-TERM CURRENT USE OF INSULIN (MULTI): Primary | ICD-10-CM

## 2024-03-04 DIAGNOSIS — R73.9 HYPERGLYCEMIA: Primary | ICD-10-CM

## 2024-03-04 LAB
ALBUMIN SERPL BCP-MCNC: 3.9 G/DL (ref 3.4–5)
ALP SERPL-CCNC: 47 U/L (ref 33–136)
ALT SERPL W P-5'-P-CCNC: 13 U/L (ref 7–45)
ANION GAP BLDV CALCULATED.4IONS-SCNC: 10 MMOL/L (ref 10–25)
ANION GAP SERPL CALC-SCNC: 11 MMOL/L (ref 10–20)
APPEARANCE UR: ABNORMAL
AST SERPL W P-5'-P-CCNC: 17 U/L (ref 9–39)
BASE EXCESS BLDV CALC-SCNC: -0.1 MMOL/L (ref -2–3)
BASOPHILS # BLD AUTO: 0.04 X10*3/UL (ref 0–0.1)
BASOPHILS NFR BLD AUTO: 0.4 %
BILIRUB SERPL-MCNC: 0.3 MG/DL (ref 0–1.2)
BILIRUB UR STRIP.AUTO-MCNC: NEGATIVE MG/DL
BODY TEMPERATURE: ABNORMAL
BUN SERPL-MCNC: 26 MG/DL (ref 6–23)
CA-I BLDV-SCNC: 1.16 MMOL/L (ref 1.1–1.33)
CALCIUM SERPL-MCNC: 9.2 MG/DL (ref 8.6–10.3)
CHLORIDE BLDV-SCNC: 104 MMOL/L (ref 98–107)
CHLORIDE SERPL-SCNC: 99 MMOL/L (ref 98–107)
CO2 SERPL-SCNC: 28 MMOL/L (ref 21–32)
COLOR UR: YELLOW
CREAT SERPL-MCNC: 1.23 MG/DL (ref 0.5–1.05)
EGFRCR SERPLBLD CKD-EPI 2021: 45 ML/MIN/1.73M*2
EOSINOPHIL # BLD AUTO: 0.13 X10*3/UL (ref 0–0.4)
EOSINOPHIL NFR BLD AUTO: 1.3 %
ERYTHROCYTE [DISTWIDTH] IN BLOOD BY AUTOMATED COUNT: 13.1 % (ref 11.5–14.5)
GLUCOSE BLD MANUAL STRIP-MCNC: 172 MG/DL (ref 74–99)
GLUCOSE BLD MANUAL STRIP-MCNC: 417 MG/DL (ref 74–99)
GLUCOSE BLDV-MCNC: 323 MG/DL (ref 74–99)
GLUCOSE SERPL-MCNC: 448 MG/DL (ref 74–99)
GLUCOSE UR STRIP.AUTO-MCNC: ABNORMAL MG/DL
HCO3 BLDV-SCNC: 25.9 MMOL/L (ref 22–26)
HCT VFR BLD AUTO: 40.2 % (ref 36–46)
HCT VFR BLD EST: 36 % (ref 36–46)
HGB BLD-MCNC: 13.2 G/DL (ref 12–16)
HGB BLDV-MCNC: 11.9 G/DL (ref 12–16)
HOLD SPECIMEN: NORMAL
IMM GRANULOCYTES # BLD AUTO: 0.04 X10*3/UL (ref 0–0.5)
IMM GRANULOCYTES NFR BLD AUTO: 0.4 % (ref 0–0.9)
INHALED O2 CONCENTRATION: 70 %
INR PPP: 1 (ref 0.9–1.1)
KETONES UR STRIP.AUTO-MCNC: NEGATIVE MG/DL
LACTATE BLDV-SCNC: 1.2 MMOL/L (ref 0.4–2)
LEUKOCYTE ESTERASE UR QL STRIP.AUTO: NEGATIVE
LIPASE SERPL-CCNC: 16 U/L (ref 9–82)
LYMPHOCYTES # BLD AUTO: 1.67 X10*3/UL (ref 0.8–3)
LYMPHOCYTES NFR BLD AUTO: 16.4 %
MCH RBC QN AUTO: 28.4 PG (ref 26–34)
MCHC RBC AUTO-ENTMCNC: 32.8 G/DL (ref 32–36)
MCV RBC AUTO: 87 FL (ref 80–100)
MONOCYTES # BLD AUTO: 0.68 X10*3/UL (ref 0.05–0.8)
MONOCYTES NFR BLD AUTO: 6.7 %
NEUTROPHILS # BLD AUTO: 7.65 X10*3/UL (ref 1.6–5.5)
NEUTROPHILS NFR BLD AUTO: 74.8 %
NITRITE UR QL STRIP.AUTO: NEGATIVE
NRBC BLD-RTO: 0 /100 WBCS (ref 0–0)
OXYHGB MFR BLDV: 52 % (ref 45–75)
PCO2 BLDV: 47 MM HG (ref 41–51)
PH BLDV: 7.35 PH (ref 7.33–7.43)
PH UR STRIP.AUTO: 6 [PH]
PLATELET # BLD AUTO: 308 X10*3/UL (ref 150–450)
PO2 BLDV: 33 MM HG (ref 35–45)
POTASSIUM BLDV-SCNC: 4.5 MMOL/L (ref 3.5–5.3)
POTASSIUM SERPL-SCNC: 4.4 MMOL/L (ref 3.5–5.3)
PROT SERPL-MCNC: 6.6 G/DL (ref 6.4–8.2)
PROT UR STRIP.AUTO-MCNC: NEGATIVE MG/DL
PROTHROMBIN TIME: 10.7 SECONDS (ref 9.8–12.8)
RBC # BLD AUTO: 4.64 X10*6/UL (ref 4–5.2)
RBC # UR STRIP.AUTO: NEGATIVE /UL
SAO2 % BLDV: 53 % (ref 45–75)
SODIUM BLDV-SCNC: 135 MMOL/L (ref 136–145)
SODIUM SERPL-SCNC: 134 MMOL/L (ref 136–145)
SP GR UR STRIP.AUTO: 1.02
UROBILINOGEN UR STRIP.AUTO-MCNC: <2 MG/DL
WBC # BLD AUTO: 10.2 X10*3/UL (ref 4.4–11.3)

## 2024-03-04 PROCEDURE — 81003 URINALYSIS AUTO W/O SCOPE: CPT | Performed by: EMERGENCY MEDICINE

## 2024-03-04 PROCEDURE — 36415 COLL VENOUS BLD VENIPUNCTURE: CPT | Performed by: EMERGENCY MEDICINE

## 2024-03-04 PROCEDURE — 85025 COMPLETE CBC W/AUTO DIFF WBC: CPT | Performed by: EMERGENCY MEDICINE

## 2024-03-04 PROCEDURE — 82947 ASSAY GLUCOSE BLOOD QUANT: CPT

## 2024-03-04 PROCEDURE — 82947 ASSAY GLUCOSE BLOOD QUANT: CPT | Mod: 59

## 2024-03-04 PROCEDURE — 83690 ASSAY OF LIPASE: CPT | Performed by: EMERGENCY MEDICINE

## 2024-03-04 PROCEDURE — 93005 ELECTROCARDIOGRAM TRACING: CPT

## 2024-03-04 PROCEDURE — 85610 PROTHROMBIN TIME: CPT | Performed by: EMERGENCY MEDICINE

## 2024-03-04 PROCEDURE — 2500000002 HC RX 250 W HCPCS SELF ADMINISTERED DRUGS (ALT 637 FOR MEDICARE OP, ALT 636 FOR OP/ED)

## 2024-03-04 PROCEDURE — 80053 COMPREHEN METABOLIC PANEL: CPT | Performed by: EMERGENCY MEDICINE

## 2024-03-04 PROCEDURE — 99283 EMERGENCY DEPT VISIT LOW MDM: CPT | Mod: 25

## 2024-03-04 PROCEDURE — 99284 EMERGENCY DEPT VISIT MOD MDM: CPT | Performed by: EMERGENCY MEDICINE

## 2024-03-04 PROCEDURE — 2500000004 HC RX 250 GENERAL PHARMACY W/ HCPCS (ALT 636 FOR OP/ED)

## 2024-03-04 PROCEDURE — 84132 ASSAY OF SERUM POTASSIUM: CPT | Performed by: EMERGENCY MEDICINE

## 2024-03-04 RX ORDER — INSULIN LISPRO 100 [IU]/ML
10 INJECTION, SOLUTION INTRAVENOUS; SUBCUTANEOUS ONCE
Status: COMPLETED | OUTPATIENT
Start: 2024-03-04 | End: 2024-03-04

## 2024-03-04 RX ADMIN — INSULIN LISPRO 10 UNITS: 100 INJECTION, SOLUTION INTRAVENOUS; SUBCUTANEOUS at 14:35

## 2024-03-04 RX ADMIN — SODIUM CHLORIDE 1000 ML: 9 INJECTION, SOLUTION INTRAVENOUS at 14:22

## 2024-03-04 ASSESSMENT — PAIN SCALES - GENERAL
PAINLEVEL_OUTOF10: 0 - NO PAIN

## 2024-03-04 ASSESSMENT — COLUMBIA-SUICIDE SEVERITY RATING SCALE - C-SSRS
6. HAVE YOU EVER DONE ANYTHING, STARTED TO DO ANYTHING, OR PREPARED TO DO ANYTHING TO END YOUR LIFE?: NO
1. IN THE PAST MONTH, HAVE YOU WISHED YOU WERE DEAD OR WISHED YOU COULD GO TO SLEEP AND NOT WAKE UP?: NO
2. HAVE YOU ACTUALLY HAD ANY THOUGHTS OF KILLING YOURSELF?: NO

## 2024-03-04 ASSESSMENT — LIFESTYLE VARIABLES
HAVE YOU EVER FELT YOU SHOULD CUT DOWN ON YOUR DRINKING: NO
HAVE PEOPLE ANNOYED YOU BY CRITICIZING YOUR DRINKING: NO
EVER HAD A DRINK FIRST THING IN THE MORNING TO STEADY YOUR NERVES TO GET RID OF A HANGOVER: NO
EVER FELT BAD OR GUILTY ABOUT YOUR DRINKING: NO

## 2024-03-04 ASSESSMENT — PAIN - FUNCTIONAL ASSESSMENT: PAIN_FUNCTIONAL_ASSESSMENT: 0-10

## 2024-03-04 NOTE — ED TRIAGE NOTES
Elevated Blood sugar at SNF, no orders for short acting insulin only long acting. SNF could not obtian orders for short acting insulin. Blood sugar checked at triage, pt has no complaints states she feels ok. 1 L bolus of NS is running from squad.

## 2024-03-04 NOTE — DISCHARGE INSTRUCTIONS
You have high blood sugar when he came to the hospital and we gave you some insulin and fluids and your blood sugar corrected to normal.  Please check your blood sugar regularly today and tomorrow.  Can return to your home insulin regimen but just be careful to monitor your blood sugars.  Please follow-up with your primary care physician within the next 2 days.

## 2024-03-04 NOTE — TELEPHONE ENCOUNTER
Rn from [facility] called regarding pts sugar -insulin was held this morning bc it was 55  & rechecked after bf & it was over 500.    Gave insulin at 10 & will recheck at 11.  [phone]  Recheck at 11 was 521  Will recheck in 1 hour again.

## 2024-03-05 LAB — HOLD SPECIMEN: NORMAL

## 2024-03-05 NOTE — ED PROVIDER NOTES
HPI   Chief Complaint   Patient presents with    Hyperglycemia     464 BG with squad 417 here       Skylar is a 78-year-old female present emergency department with hyperglycemia.  She initially had an episode of hypoglycemia earlier this morning with blood sugar in the 50s.  They held her morning insulin and after mealtime her blood sugars went up to the 500s.  She has long-acting insulin and 70/30 insulin ordered no short acting insulin so she was sent to the emergency department from her assisted living.  Patient was not having any symptoms, no dizziness or lightheadedness, no nausea or vomiting, no dysuria or abdominal pain.  Patient is having any chest pain, shortness of breath, confusion.  She states that her blood sugars are usually well-controlled on her current insulin regimen.  She is requesting referral for an endocrinologist on discharge.  Does not have history of DKA and patient says she is never been hospitalized for her diabetes.                          Perry Coma Scale Score: 15                     Patient History   Past Medical History:   Diagnosis Date    Abnormal results of kidney function studies 09/21/2021    Decreased GFR    Anorexia 04/28/2020    Decreased appetite    Anxiety     Candidiasis of skin and nail     Monilial intertrigo    Chills (without fever) 04/28/2020    Chills    Corns and callosities 10/01/2015    Callus    Cough, unspecified     Cough    Diabetes mellitus (CMS/HCC)     Disease of thyroid gland     Dizziness and giddiness     Dizzy spells    Encounter for general adult medical examination without abnormal findings 01/07/2020    Annual physical exam    Generalized abdominal pain 04/28/2020    Colicky abdominal pain    Hypertension     Other conditions influencing health status 12/11/2014    Abdominal abscess    Other hypertrophic disorders of the skin 07/18/2017    Cutaneous skin tags    Other injury of unspecified body region, initial encounter     Muscle tear    Overweight      Overweight (BMI 25.0-29.9)    Pain in right foot 04/24/2019    Foot pain, bilateral    Peptic ulceration     Personal history of (healed) traumatic fracture     Personal history of fracture    Personal history of other benign neoplasm 07/18/2017    History of other benign neoplasm    Personal history of other diseases of the digestive system 08/17/2015    History of constipation    Personal history of other diseases of the digestive system     History of diverticulitis of colon    Personal history of other diseases of the musculoskeletal system and connective tissue 03/26/2018    History of back pain    Personal history of other diseases of the musculoskeletal system and connective tissue     History of low back pain    Personal history of other diseases of the nervous system and sense organs 11/11/2016    History of hearing loss    Personal history of other diseases of the respiratory system 12/03/2021    History of acute bronchitis    Personal history of other diseases of the respiratory system     History of bronchitis    Personal history of other diseases of urinary system     History of kidney disease    Personal history of other medical treatment     History of mammogram    Personal history of other mental and behavioral disorders     History of depression    Personal history of other specified conditions 12/21/2013    History of edema    Personal history of other specified conditions 09/19/2019    History of persistent cough    Personal history of pneumonia (recurrent)     History of pneumonia    Repeated falls     Falling episodes    Tinea unguium 07/24/2018    Dermatophytosis of nail    Unspecified abdominal pain 05/05/2020    Abdominal pain of multiple sites    Unspecified fracture of right patella, initial encounter for closed fracture     Closed fracture of right patella    Visual impairment      Past Surgical History:   Procedure Laterality Date    BACK SURGERY  11/10/2014    Back Surgery     COLONOSCOPY  11/10/2014    Complete Colonoscopy    HAND SURGERY  10/28/2014    Hand Surgery                                                                                                                                                          LUMBAR FUSION  12/19/2016    Lumbar Vertebral Fusion    LUMBAR LAMINECTOMY  09/28/2016    Laminectomy Lumbar    OTHER SURGICAL HISTORY  11/10/2014    Wrist Surgery    ROTATOR CUFF REPAIR  10/28/2014    Rotator Cuff Repair     Family History   Problem Relation Name Age of Onset    Cancer Mother      Heart block Mother      Diabetes Father       Social History     Tobacco Use    Smoking status: Never    Smokeless tobacco: Never   Substance Use Topics    Alcohol use: Never    Drug use: Never       Physical Exam   ED Triage Vitals [03/04/24 1228]   Temperature Heart Rate Respirations BP   37 °C (98.6 °F) 82 (!) 23 (!) 195/62      Pulse Ox Temp Source Heart Rate Source Patient Position   97 % Temporal Monitor --      BP Location FiO2 (%)     Right arm --       Physical Exam  Constitutional:       General: She is not in acute distress.     Appearance: She is normal weight. She is not toxic-appearing or diaphoretic.   HENT:      Mouth/Throat:      Mouth: Mucous membranes are moist.      Pharynx: Oropharynx is clear.   Eyes:      Extraocular Movements: Extraocular movements intact.      Conjunctiva/sclera: Conjunctivae normal.      Pupils: Pupils are equal, round, and reactive to light.   Cardiovascular:      Rate and Rhythm: Normal rate and regular rhythm.      Pulses: Normal pulses.      Heart sounds: No murmur heard.  Pulmonary:      Effort: Pulmonary effort is normal. No respiratory distress.      Breath sounds: Normal breath sounds. No wheezing, rhonchi or rales.   Abdominal:      General: Abdomen is flat. There is no distension.      Palpations: Abdomen is soft.      Tenderness: There is no abdominal tenderness. There is no guarding or rebound.   Musculoskeletal:          General: No tenderness.      Right lower leg: No edema.      Left lower leg: No edema.   Skin:     General: Skin is warm and dry.      Capillary Refill: Capillary refill takes less than 2 seconds.   Neurological:      General: No focal deficit present.      Mental Status: She is alert and oriented to person, place, and time.      Motor: No weakness.         ED Course & MDM   Diagnoses as of 03/04/24 1936   Hyperglycemia       Medical Decision Making  Skylar is a well-appearing 78-year-old female presenting to the emergency department for hyperglycemia    On initial exam patient is in no acute distress hemodynamically stable otherwise has no complaints.  She presents after she has a hypoglycemic episode due to having her morning insulin 70/30 dose held due to a low blood sugar in the 50s this morning.  She proceeded to eat her meal and her blood sugars went up.  Given that her nursing home did not have orders for short acting insulin they felt uncomfortable giving her anything and sent her to the emergency department.  On exam patient is well-appearing does not have any signs of DKA or hyperglycemia.  Initial blood glucose is 470s.  Patient was given 1 L of fluids by EMS and an additional liter of fluids here in the emergency department bolus 10 units of insulin.  CBC within normal limits.  CMP shows no gap, a creatinine 1.23 consistent with patient's baseline as well as hyperglycemia.  Lipase was normal.  VBG showed patient not acidotic.  Patient's glucose corrected to the 170s after getting insulin here in the emergency department.  Urine showed no signs of infection.  Given the patient was well-appearing with no signs of DKA or hypoglycemia and her blood sugar corrected.  Patient was discharged to home in chronic condition        Procedure  Procedures     Nahid Scott DO  Resident  03/04/24 1944

## 2024-03-08 LAB
ATRIAL RATE: 79 BPM
P AXIS: 86 DEGREES
P OFFSET: 206 MS
P ONSET: 138 MS
PR INTERVAL: 182 MS
Q ONSET: 229 MS
QRS COUNT: 13 BEATS
QRS DURATION: 86 MS
QT INTERVAL: 412 MS
QTC CALCULATION(BAZETT): 472 MS
QTC FREDERICIA: 451 MS
R AXIS: 55 DEGREES
T AXIS: 67 DEGREES
T OFFSET: 435 MS
VENTRICULAR RATE: 79 BPM

## 2024-03-11 ENCOUNTER — TELEPHONE (OUTPATIENT)
Dept: PRIMARY CARE | Facility: CLINIC | Age: 79
End: 2024-03-11
Payer: COMMERCIAL

## 2024-03-11 DIAGNOSIS — E78.2 MIXED HYPERLIPIDEMIA: Primary | ICD-10-CM

## 2024-03-11 RX ORDER — FENOFIBRATE 54 MG/1
54 TABLET ORAL DAILY
Qty: 90 TABLET | Status: CANCELLED | OUTPATIENT
Start: 2024-03-11

## 2024-03-11 RX ORDER — FENOFIBRATE 48 MG/1
48 TABLET, FILM COATED ORAL DAILY
Qty: 90 TABLET | Refills: 3 | Status: SHIPPED | OUTPATIENT
Start: 2024-03-11 | End: 2025-03-11

## 2024-03-11 RX ORDER — SYRINGE,SAFETY WITH NEEDLE,1ML 25GX1"
SYRINGE (EA) MISCELLANEOUS
Qty: 100 EACH | Refills: 11 | Status: SHIPPED | OUTPATIENT
Start: 2024-03-11 | End: 2025-03-11

## 2024-03-11 NOTE — TELEPHONE ENCOUNTER
Pt was here in December & coming next month but needed refill on insulin syringes   She is in facility & needed soon as possible  [phone]  No allergies  Ty

## 2024-04-03 ENCOUNTER — HOSPITAL ENCOUNTER (EMERGENCY)
Facility: HOSPITAL | Age: 79
Discharge: HOME | End: 2024-04-03
Attending: EMERGENCY MEDICINE
Payer: COMMERCIAL

## 2024-04-03 ENCOUNTER — APPOINTMENT (OUTPATIENT)
Dept: RADIOLOGY | Facility: HOSPITAL | Age: 79
End: 2024-04-03
Payer: COMMERCIAL

## 2024-04-03 VITALS
OXYGEN SATURATION: 97 % | HEART RATE: 94 BPM | HEIGHT: 63 IN | WEIGHT: 180 LBS | TEMPERATURE: 97 F | SYSTOLIC BLOOD PRESSURE: 132 MMHG | BODY MASS INDEX: 31.89 KG/M2 | DIASTOLIC BLOOD PRESSURE: 84 MMHG | RESPIRATION RATE: 22 BRPM

## 2024-04-03 DIAGNOSIS — R91.8 INFILTRATE OF RIGHT LUNG PRESENT ON CHEST X-RAY: Primary | ICD-10-CM

## 2024-04-03 DIAGNOSIS — J18.9 COMMUNITY ACQUIRED PNEUMONIA OF RIGHT UPPER LOBE OF LUNG: ICD-10-CM

## 2024-04-03 LAB
ALBUMIN SERPL BCP-MCNC: 4 G/DL (ref 3.4–5)
ALP SERPL-CCNC: 56 U/L (ref 33–136)
ALT SERPL W P-5'-P-CCNC: 14 U/L (ref 7–45)
ANION GAP SERPL CALC-SCNC: 13 MMOL/L (ref 10–20)
AST SERPL W P-5'-P-CCNC: 19 U/L (ref 9–39)
BASOPHILS # BLD AUTO: 0.04 X10*3/UL (ref 0–0.1)
BASOPHILS NFR BLD AUTO: 0.3 %
BILIRUB SERPL-MCNC: 0.3 MG/DL (ref 0–1.2)
BUN SERPL-MCNC: 26 MG/DL (ref 6–23)
CALCIUM SERPL-MCNC: 9.4 MG/DL (ref 8.6–10.3)
CHLORIDE SERPL-SCNC: 102 MMOL/L (ref 98–107)
CO2 SERPL-SCNC: 27 MMOL/L (ref 21–32)
CREAT SERPL-MCNC: 1.22 MG/DL (ref 0.5–1.05)
EGFRCR SERPLBLD CKD-EPI 2021: 46 ML/MIN/1.73M*2
EOSINOPHIL # BLD AUTO: 0.51 X10*3/UL (ref 0–0.4)
EOSINOPHIL NFR BLD AUTO: 4.3 %
ERYTHROCYTE [DISTWIDTH] IN BLOOD BY AUTOMATED COUNT: 13.3 % (ref 11.5–14.5)
FLUAV RNA RESP QL NAA+PROBE: NOT DETECTED
FLUBV RNA RESP QL NAA+PROBE: NOT DETECTED
GLUCOSE SERPL-MCNC: 249 MG/DL (ref 74–99)
HCT VFR BLD AUTO: 39.6 % (ref 36–46)
HGB BLD-MCNC: 13.1 G/DL (ref 12–16)
IMM GRANULOCYTES # BLD AUTO: 0.03 X10*3/UL (ref 0–0.5)
IMM GRANULOCYTES NFR BLD AUTO: 0.3 % (ref 0–0.9)
LYMPHOCYTES # BLD AUTO: 2.01 X10*3/UL (ref 0.8–3)
LYMPHOCYTES NFR BLD AUTO: 17 %
MCH RBC QN AUTO: 28.7 PG (ref 26–34)
MCHC RBC AUTO-ENTMCNC: 33.1 G/DL (ref 32–36)
MCV RBC AUTO: 87 FL (ref 80–100)
MONOCYTES # BLD AUTO: 1.01 X10*3/UL (ref 0.05–0.8)
MONOCYTES NFR BLD AUTO: 8.5 %
NEUTROPHILS # BLD AUTO: 8.22 X10*3/UL (ref 1.6–5.5)
NEUTROPHILS NFR BLD AUTO: 69.6 %
NRBC BLD-RTO: 0 /100 WBCS (ref 0–0)
PLATELET # BLD AUTO: 358 X10*3/UL (ref 150–450)
POTASSIUM SERPL-SCNC: 4.5 MMOL/L (ref 3.5–5.3)
PROT SERPL-MCNC: 6.6 G/DL (ref 6.4–8.2)
RBC # BLD AUTO: 4.57 X10*6/UL (ref 4–5.2)
SARS-COV-2 RNA RESP QL NAA+PROBE: NOT DETECTED
SODIUM SERPL-SCNC: 137 MMOL/L (ref 136–145)
WBC # BLD AUTO: 11.8 X10*3/UL (ref 4.4–11.3)

## 2024-04-03 PROCEDURE — 71250 CT THORAX DX C-: CPT | Performed by: RADIOLOGY

## 2024-04-03 PROCEDURE — 36415 COLL VENOUS BLD VENIPUNCTURE: CPT

## 2024-04-03 PROCEDURE — 80053 COMPREHEN METABOLIC PANEL: CPT

## 2024-04-03 PROCEDURE — 2500000001 HC RX 250 WO HCPCS SELF ADMINISTERED DRUGS (ALT 637 FOR MEDICARE OP)

## 2024-04-03 PROCEDURE — 85025 COMPLETE CBC W/AUTO DIFF WBC: CPT

## 2024-04-03 PROCEDURE — 87635 SARS-COV-2 COVID-19 AMP PRB: CPT | Performed by: EMERGENCY MEDICINE

## 2024-04-03 PROCEDURE — 2500000002 HC RX 250 W HCPCS SELF ADMINISTERED DRUGS (ALT 637 FOR MEDICARE OP, ALT 636 FOR OP/ED)

## 2024-04-03 PROCEDURE — 71045 X-RAY EXAM CHEST 1 VIEW: CPT | Performed by: RADIOLOGY

## 2024-04-03 PROCEDURE — 99284 EMERGENCY DEPT VISIT MOD MDM: CPT | Mod: 25

## 2024-04-03 PROCEDURE — 71045 X-RAY EXAM CHEST 1 VIEW: CPT

## 2024-04-03 PROCEDURE — 99284 EMERGENCY DEPT VISIT MOD MDM: CPT

## 2024-04-03 PROCEDURE — 94640 AIRWAY INHALATION TREATMENT: CPT

## 2024-04-03 PROCEDURE — 71250 CT THORAX DX C-: CPT

## 2024-04-03 RX ORDER — AMOXICILLIN AND CLAVULANATE POTASSIUM 875; 125 MG/1; MG/1
1 TABLET, FILM COATED ORAL 2 TIMES DAILY
Qty: 10 TABLET | Refills: 0 | Status: SHIPPED | OUTPATIENT
Start: 2024-04-03 | End: 2024-04-08

## 2024-04-03 RX ORDER — AZITHROMYCIN 250 MG/1
250 TABLET, FILM COATED ORAL DAILY
Qty: 4 TABLET | Refills: 0 | Status: SHIPPED | OUTPATIENT
Start: 2024-04-03 | End: 2024-04-07

## 2024-04-03 RX ORDER — BENZONATATE 100 MG/1
100 CAPSULE ORAL 3 TIMES DAILY PRN
Qty: 21 CAPSULE | Refills: 0 | Status: SHIPPED | OUTPATIENT
Start: 2024-04-03 | End: 2024-04-10

## 2024-04-03 RX ORDER — BENZONATATE 100 MG/1
100 CAPSULE ORAL ONCE
Status: COMPLETED | OUTPATIENT
Start: 2024-04-03 | End: 2024-04-03

## 2024-04-03 RX ORDER — ALBUTEROL SULFATE 90 UG/1
2 AEROSOL, METERED RESPIRATORY (INHALATION) EVERY 4 HOURS PRN
Qty: 18 G | Refills: 1 | Status: SHIPPED | OUTPATIENT
Start: 2024-04-03 | End: 2024-05-03

## 2024-04-03 RX ORDER — ALBUTEROL SULFATE 0.83 MG/ML
2.5 SOLUTION RESPIRATORY (INHALATION) ONCE
Status: COMPLETED | OUTPATIENT
Start: 2024-04-03 | End: 2024-04-03

## 2024-04-03 RX ORDER — ALBUTEROL SULFATE 0.83 MG/ML
SOLUTION RESPIRATORY (INHALATION)
Status: DISCONTINUED
Start: 2024-04-03 | End: 2024-04-04 | Stop reason: HOSPADM

## 2024-04-03 RX ORDER — AZITHROMYCIN 500 MG/1
500 TABLET, FILM COATED ORAL ONCE
Status: COMPLETED | OUTPATIENT
Start: 2024-04-03 | End: 2024-04-03

## 2024-04-03 RX ORDER — AMOXICILLIN AND CLAVULANATE POTASSIUM 875; 125 MG/1; MG/1
1 TABLET, FILM COATED ORAL ONCE
Status: COMPLETED | OUTPATIENT
Start: 2024-04-03 | End: 2024-04-03

## 2024-04-03 RX ADMIN — BENZONATATE 100 MG: 100 CAPSULE ORAL at 20:02

## 2024-04-03 RX ADMIN — ALBUTEROL SULFATE 2.5 MG: 2.5 SOLUTION RESPIRATORY (INHALATION) at 23:00

## 2024-04-03 RX ADMIN — AMOXICILLIN AND CLAVULANATE POTASSIUM 1 TABLET: 875; 125 TABLET, FILM COATED ORAL at 23:20

## 2024-04-03 RX ADMIN — AZITHROMYCIN DIHYDRATE 500 MG: 500 TABLET ORAL at 23:20

## 2024-04-03 ASSESSMENT — COLUMBIA-SUICIDE SEVERITY RATING SCALE - C-SSRS
2. HAVE YOU ACTUALLY HAD ANY THOUGHTS OF KILLING YOURSELF?: NO
6. HAVE YOU EVER DONE ANYTHING, STARTED TO DO ANYTHING, OR PREPARED TO DO ANYTHING TO END YOUR LIFE?: NO
1. IN THE PAST MONTH, HAVE YOU WISHED YOU WERE DEAD OR WISHED YOU COULD GO TO SLEEP AND NOT WAKE UP?: NO

## 2024-04-03 ASSESSMENT — LIFESTYLE VARIABLES
HAVE PEOPLE ANNOYED YOU BY CRITICIZING YOUR DRINKING: NO
HAVE YOU EVER FELT YOU SHOULD CUT DOWN ON YOUR DRINKING: NO
TOTAL SCORE: 0
EVER FELT BAD OR GUILTY ABOUT YOUR DRINKING: NO
EVER HAD A DRINK FIRST THING IN THE MORNING TO STEADY YOUR NERVES TO GET RID OF A HANGOVER: NO

## 2024-04-03 ASSESSMENT — PAIN - FUNCTIONAL ASSESSMENT: PAIN_FUNCTIONAL_ASSESSMENT: 0-10

## 2024-04-03 ASSESSMENT — PAIN SCALES - GENERAL: PAINLEVEL_OUTOF10: 0 - NO PAIN

## 2024-04-03 NOTE — ED NOTES
Presents to ED for cough x2 weeks. Denies CP, SOB, fever, n/v/d. Pt also denies weakness.      Herlinda Moore RN  04/03/24 7420

## 2024-04-03 NOTE — ED PROVIDER NOTES
Emergency Department Encounter  [facility] EMERGENCY MEDICINE    Patient: Skylar Parra  MRN: [id number]  : 1945  Date of Evaluation: 4/3/2024  ED Provider: KIRAN Saha      Chief Complaint       Chief Complaint   Patient presents with    Cough     X 2 weeks     Douglas    (Location/Symptom, Timing/Onset, Context/Setting, Quality, Duration, Modifying Factors, Severity) Note limiting factors.   Limitations to History: None   Historian: Patient  Records reviewed: EMR inpatient and outpatient notes, Care Everywhere      Skylar Parra is a 78 y.o. femalewith past medical history of T2DM, hypertension who presents to the emergency department complaining of cough for the last 2 weeks.  She denies fever, shortness of breath, chest pain, sore throat, neck swelling, nausea/vomiting, abdominal pain, history of GERD, swelling.  She does take benazepril.    ROS:     Review of Systems  14 systems reviewed and otherwise acutely negative except as in the Douglas.          Past History     Past Medical History:   Diagnosis Date    Abnormal results of kidney function studies 2021    Decreased GFR    Anorexia 2020    Decreased appetite    Anxiety     Candidiasis of skin and nail     Monilial intertrigo    Chills (without fever) 2020    Chills    Corns and callosities 10/01/2015    Callus    Cough, unspecified     Cough    Diabetes mellitus (CMS/HCC)     Disease of thyroid gland     Dizziness and giddiness     Dizzy spells    Encounter for general adult medical examination without abnormal findings 2020    Annual physical exam    Generalized abdominal pain 2020    Colicky abdominal pain    Hypertension     Other conditions influencing health status 2014    Abdominal abscess    Other hypertrophic disorders of the skin 2017    Cutaneous skin tags    Other injury of unspecified body region, initial encounter     Muscle tear    Overweight     Overweight (BMI 25.0-29.9)     Pain in right foot 04/24/2019    Foot pain, bilateral    Peptic ulceration     Personal history of (healed) traumatic fracture     Personal history of fracture    Personal history of other benign neoplasm 07/18/2017    History of other benign neoplasm    Personal history of other diseases of the digestive system 08/17/2015    History of constipation    Personal history of other diseases of the digestive system     History of diverticulitis of colon    Personal history of other diseases of the musculoskeletal system and connective tissue 03/26/2018    History of back pain    Personal history of other diseases of the musculoskeletal system and connective tissue     History of low back pain    Personal history of other diseases of the nervous system and sense organs 11/11/2016    History of hearing loss    Personal history of other diseases of the respiratory system 12/03/2021    History of acute bronchitis    Personal history of other diseases of the respiratory system     History of bronchitis    Personal history of other diseases of urinary system     History of kidney disease    Personal history of other medical treatment     History of mammogram    Personal history of other mental and behavioral disorders     History of depression    Personal history of other specified conditions 12/21/2013    History of edema    Personal history of other specified conditions 09/19/2019    History of persistent cough    Personal history of pneumonia (recurrent)     History of pneumonia    Repeated falls     Falling episodes    Tinea unguium 07/24/2018    Dermatophytosis of nail    Unspecified abdominal pain 05/05/2020    Abdominal pain of multiple sites    Unspecified fracture of right patella, initial encounter for closed fracture     Closed fracture of right patella    Visual impairment      Past Surgical History:   Procedure Laterality Date    BACK SURGERY  11/10/2014    Back Surgery    COLONOSCOPY  11/10/2014    Complete  Colonoscopy    HAND SURGERY  10/28/2014    Hand Surgery                                                                                                                                                          LUMBAR FUSION  12/19/2016    Lumbar Vertebral Fusion    LUMBAR LAMINECTOMY  09/28/2016    Laminectomy Lumbar    OTHER SURGICAL HISTORY  11/10/2014    Wrist Surgery    ROTATOR CUFF REPAIR  10/28/2014    Rotator Cuff Repair     Social History     Socioeconomic History    Marital status: Single     Spouse name: None    Number of children: None    Years of education: None    Highest education level: None   Occupational History    None   Tobacco Use    Smoking status: Never    Smokeless tobacco: Never   Substance and Sexual Activity    Alcohol use: Never    Drug use: Never    Sexual activity: None   Other Topics Concern    None   Social History Narrative    None     Social Determinants of Health     Financial Resource Strain: Not on file   Food Insecurity: Not on file   Transportation Needs: Not on file   Physical Activity: Not on file   Stress: Not on file   Social Connections: Not on file   Intimate Partner Violence: Not on file   Housing Stability: Not on file       Medications/Allergies     Previous Medications    ACETAMINOPHEN (TYLENOL 8 HOUR) 650 MG ER TABLET    Take 1 tablet (650 mg) by mouth every 8 hours if needed.    BENAZEPRIL (LOTENSIN) 10 MG TABLET    Take 1 tablet (10 mg) by mouth once daily.    BLOOD SUGAR DIAGNOSTIC (ONETOUCH ULTRA TEST) STRIP    USE TO TEST TWICE A DAY    CHOLECALCIFEROL (VITAMIN D-3) 50 MCG (2,000 UNIT) CAPSULE    Take 1 capsule (50 mcg) by mouth once daily.    DIPHENHYDRAMINE (SOMINEX) 25 MG TABLET    Take 1 tablet (25 mg) by mouth as needed at bedtime for sleep.    ESCITALOPRAM (LEXAPRO) 10 MG TABLET    Take 1 tablet (10 mg) by mouth once daily.    FENOFIBRATE (TRICOR) 48 MG TABLET    Take 1 tablet (48 mg) by mouth once daily.    FENOFIBRATE (TRICOR) 54 MG TABLET    Take 1 tablet  "(54 mg) by mouth once daily.    GLIMEPIRIDE (AMARYL) 4 MG TABLET    Take 1 tablet (4 mg) by mouth 2 times a day.    GUAIFENESIN (ROBITUSSIN) 100 MG/5 ML SYRUP    Take 10 mL (200 mg) by mouth 3 times a day as needed for cough.    INSULIN DETEMIR (LEVEMIR U-100 INSULIN) 100 UNIT/ML INJECTION    Inject 20 Units under the skin once daily at bedtime. Take as directed per insulin instructions.    INSULIN NPH AND REGULAR HUMAN (NOVOLIN 70/30 U-100 INSULIN) 100 UNIT/ML (70-30) INJECTION    Use 15 units in a.m. and 15 units in p.m.    INSULIN SYRINGE-NEEDLE U-100 31G X 5/16\" 1 ML SYRINGE    Use to inject 1-4 times daily as directed.    MAGNESIUM OXIDE (MAG-OX) 400 MG TABLET    1 tablet (400 mg) once daily.    VITAMIN B COMPLEX TABLET    Take 1 tablet by mouth once daily.     No Known Allergies     Physical Exam       ED Triage Vitals [04/03/24 1821]   Temperature Heart Rate Respirations BP   36.1 °C (97 °F) 91 18 129/59      Pulse Ox Temp Source Heart Rate Source Patient Position   99 % Temporal Monitor Sitting      BP Location FiO2 (%)     Right arm --         Physical Exam  Vitals and nursing note reviewed.   Constitutional:       General: She is not in acute distress.     Appearance: Normal appearance. She is not ill-appearing or toxic-appearing.   HENT:      Head: Normocephalic.      Right Ear: External ear normal.      Left Ear: External ear normal.      Nose: Nose normal.      Mouth/Throat:      Mouth: Mucous membranes are moist.      Pharynx: Oropharynx is clear.   Eyes:      Extraocular Movements: Extraocular movements intact.   Cardiovascular:      Rate and Rhythm: Normal rate and regular rhythm.      Pulses: Normal pulses.      Heart sounds: Normal heart sounds.   Pulmonary:      Effort: Pulmonary effort is normal. No respiratory distress.      Breath sounds: Normal breath sounds. No stridor. No wheezing, rhonchi or rales.   Abdominal:      General: Abdomen is flat. Bowel sounds are normal. There is no distension. "      Palpations: Abdomen is soft. There is no mass.      Tenderness: There is no abdominal tenderness.   Musculoskeletal:         General: No swelling. Normal range of motion.      Cervical back: No tenderness.   Lymphadenopathy:      Cervical: No cervical adenopathy.   Skin:     General: Skin is warm and dry.   Neurological:      General: No focal deficit present.      Mental Status: She is alert and oriented to person, place, and time.   Psychiatric:         Mood and Affect: Mood normal.         Behavior: Behavior normal.         Diagnostics   Labs:  Labs Reviewed   SARS-COV-2 PCR - Normal       Result Value    Coronavirus 2019, PCR Not Detected      Narrative:     This assay has received FDA Emergency Use Authorization (EUA) and is only authorized for the duration of time that circumstances exist to justify the authorization of the emergency use of in vitro diagnostic tests for the detection of SARS-CoV-2 virus and/or diagnosis of COVID-19 infection under section 564(b)(1) of the Act, 21 U.S.C. 360bbb-3(b)(1). This assay is an in vitro diagnostic nucleic acid amplification test for the qualitative detection of SARS-CoV-2 from nasopharyngeal specimens and has been validated for use at [facility]. Negative results do not preclude COVID-19 infections and should not be used as the sole basis for diagnosis, treatment, or other management decisions.     INFLUENZA A AND B PCR - Normal    Flu A Result Not Detected      Flu B Result Not Detected      Narrative:     This assay is an in vitro diagnostic multiplex nucleic acid amplification test for the detection and discrimination of Influenza A & B from nasopharyngeal specimens, and has been validated for use at [facility]. Negative results do not preclude Influenza A/B infections, and should not be used as the sole basis for diagnosis, treatment, or other management decisions. If Influenza A/B and RSV PCR results are  negative, testing for Parainfluenza virus, Adenovirus and Metapneumovirus is routinely performed for [facility] and intensive care inpatients, and is available on other patients by placing an add-on request.   CBC WITH AUTO DIFFERENTIAL   COMPREHENSIVE METABOLIC PANEL     Radiographs:  XR chest 1 view   Final Result   1.  No evidence of acute cardiopulmonary process.   2. Focal nodular opacity over the right upper lung measuring 7 mm.   Recommend CT of the chest for complete evaluation.                  MACRO:   Critical Finding:  See findings. Notification was initiated on   4/3/2024 at 6:45 pm by  Eagle Wilkinson.  (**-YCF-**)        Signed by: Eagle Wilkinson 4/3/2024 6:45 PM   Dictation workstation:   [id number]          Procedures:       EKG: interpreted by this provider  Time:  Indication:  Rate:  Rhythm:  Axis:  Interval:  ST Segment:  Comparison to Prior:      Medical decision making   In brief, Skylar Parra is a 78 y.o. female who presented to the emergency department for a 2-week cough  See history above. Vitals reviewed.  Physical examination is unremarkable.  Differential diagnoses considered include:  1.  Pneumonia  2.  Asthma/COPD  3.  Mass  Medical work up includes labs and imaging.  CXR shows focal nodular opacity to upper right lung at 7 mm.  Radiologist recommended CT scan.  Labs shows  CT shows Mild right upper lobe ground-glass and patchy infiltrate. Clinical  correlation and follow-up recommended to document resolution. No  additional acute intrathoracic abnormality is seen. Lucent lesion involving T12 vertebra,  Given Tessalon Perles  Case reviewed with Dr. Davis Weaver  Given the CT results, Will treat for pneumonia, azithromycin, Augmentin, albuterol initiated.  Patient will be prescribed azithromycin, Augmentin, albuterol and benzonatate.  Patient educated on medication administration and side effects. I discussed the differential, results and discharge plan with the  "patient . I emphasized the importance of follow-up with the physician I referred them to in the timeframe recommended. I explained reasons for the patient to return to the clinic. Questions were addressed. They understand return precautions and discharge instructions. The patient expressed understanding.            Diagnoses as of 04/03/24 2310   Infiltrate of right lung present on chest x-ray   Community acquired pneumonia of right upper lobe of lung      Visit Vitals  /59 (BP Location: Right arm, Patient Position: Sitting)   Pulse 91   Temp 36.1 °C (97 °F) (Temporal)   Resp 18   Ht 1.6 m (5' 3\")   Wt 81.6 kg (180 lb)   SpO2 99%   BMI 31.89 kg/m²   OB Status Postmenopausal   Smoking Status Never   BSA 1.9 m²       Medications - No data to display    Plan of care discussed,   Case reviewed with Dr.Patrick Weaver      Final Impression    Infiltration of right lung  Community-acquired pneumonia    DISPOSITION  Disposition: Discharge  Patient condition is: Stable    Comment: Please note this report has been produced using speech recognition software and may contain errors related to that system including errors in grammar, punctuation, and spelling, as well as words and phrases that may be inappropriate.  If there are any questions or concerns please feel free to contact the dictating provider for clarification.    KIRAN Saha  [facility]  Emergency department     KIRAN Saha  04/04/24 0655    "

## 2024-04-04 ENCOUNTER — TELEPHONE (OUTPATIENT)
Dept: PRIMARY CARE | Facility: CLINIC | Age: 79
End: 2024-04-04
Payer: COMMERCIAL

## 2024-04-04 NOTE — TELEPHONE ENCOUNTER
Pt. Was taken to er yesterday for pneumonia.  Is back at facility now.  She does have apt with you 4/17.  -[facility].

## 2024-04-04 NOTE — DISCHARGE INSTRUCTIONS
Your CAT scan showed mild right upper lobe ground-glass and patchy infiltrate and Lucent lesion involving T12 vertebrae.  You are prescribed Augmentin and azithromycin, complete course of antibiotics to its entirety.  Utilize your albuterol inhaler if you develop wheezing or shortness of breath.  May take the benzonatate 3 times a day as needed for cough  May utilize throat lozenges.  Practice deep breathing exercises  During plenty of fluids, rest  If symptoms continue follow-up with your primary care physician.  If symptoms worsen or new symptoms present return to the emergency department.

## 2024-04-17 ENCOUNTER — APPOINTMENT (OUTPATIENT)
Dept: PRIMARY CARE | Facility: CLINIC | Age: 79
End: 2024-04-17
Payer: COMMERCIAL

## 2024-06-08 DIAGNOSIS — E11.40 TYPE 2 DIABETES MELLITUS WITH DIABETIC NEUROPATHY, WITHOUT LONG-TERM CURRENT USE OF INSULIN (MULTI): Primary | ICD-10-CM

## 2024-06-08 PROBLEM — K59.00 CONSTIPATION: Status: ACTIVE | Noted: 2024-06-08

## 2024-06-08 PROBLEM — S82.009A CLOSED FRACTURE OF PATELLA: Status: ACTIVE | Noted: 2024-06-08

## 2024-06-08 PROBLEM — D22.9 MELANOCYTIC NEVUS OF SKIN: Status: ACTIVE | Noted: 2024-06-08

## 2024-06-08 PROBLEM — J20.9 ACUTE BRONCHITIS: Status: ACTIVE | Noted: 2024-06-08

## 2024-06-08 PROBLEM — R29.6 FALLING: Status: ACTIVE | Noted: 2024-06-08

## 2024-06-08 PROBLEM — I83.90 VARICOSE VEINS OF LOWER EXTREMITY: Status: ACTIVE | Noted: 2023-02-14

## 2024-06-08 PROBLEM — E66.3 OVERWEIGHT WITH BODY MASS INDEX (BMI) 25.0-29.9: Status: ACTIVE | Noted: 2024-06-08

## 2024-06-08 PROBLEM — T14.8XXA MUSCLE TEAR: Status: ACTIVE | Noted: 2024-06-08

## 2024-06-08 PROBLEM — S22.000A CLOSED WEDGE FRACTURE OF THORACIC VERTEBRA (MULTI): Status: ACTIVE | Noted: 2024-02-13

## 2024-06-08 PROBLEM — R05.3 PERSISTENT COUGH: Status: ACTIVE | Noted: 2024-06-08

## 2024-06-08 PROBLEM — Z86.59 HISTORY OF DEPRESSION: Status: ACTIVE | Noted: 2024-06-08

## 2024-06-08 PROBLEM — L82.1 SEBORRHEIC KERATOSIS: Status: ACTIVE | Noted: 2024-06-08

## 2024-06-08 PROBLEM — N39.0 URINARY TRACT INFECTION: Status: ACTIVE | Noted: 2023-07-28

## 2024-06-08 RX ORDER — HYDROCODONE BITARTRATE AND ACETAMINOPHEN 5; 325 MG/1; MG/1
TABLET ORAL
COMMUNITY
Start: 2020-10-13

## 2024-06-08 RX ORDER — HUMAN INSULIN 100 [IU]/ML
INJECTION, SUSPENSION SUBCUTANEOUS
Qty: 1 ML | Refills: 0 | Status: SHIPPED | OUTPATIENT
Start: 2024-06-08

## 2024-06-08 RX ORDER — INSULIN DETEMIR 100 [IU]/ML
INJECTION, SOLUTION SUBCUTANEOUS
Qty: 1 ML | Refills: 0 | Status: SHIPPED | OUTPATIENT
Start: 2024-06-08

## 2024-06-08 RX ORDER — PEN NEEDLE, DIABETIC 32GX 5/32"
NEEDLE, DISPOSABLE MISCELLANEOUS
COMMUNITY
Start: 2024-05-07

## 2024-10-24 ENCOUNTER — HOSPITAL ENCOUNTER (EMERGENCY)
Facility: HOSPITAL | Age: 79
Discharge: HOME | End: 2024-10-24
Attending: STUDENT IN AN ORGANIZED HEALTH CARE EDUCATION/TRAINING PROGRAM
Payer: COMMERCIAL

## 2024-10-24 VITALS
OXYGEN SATURATION: 97 % | TEMPERATURE: 97.7 F | RESPIRATION RATE: 15 BRPM | DIASTOLIC BLOOD PRESSURE: 74 MMHG | BODY MASS INDEX: 31.16 KG/M2 | HEART RATE: 73 BPM | WEIGHT: 175.93 LBS | SYSTOLIC BLOOD PRESSURE: 146 MMHG

## 2024-10-24 DIAGNOSIS — N39.0 URINARY TRACT INFECTION IN FEMALE: ICD-10-CM

## 2024-10-24 DIAGNOSIS — R31.9 HEMATURIA, UNSPECIFIED TYPE: Primary | ICD-10-CM

## 2024-10-24 LAB
APPEARANCE UR: ABNORMAL
BILIRUB UR STRIP.AUTO-MCNC: NEGATIVE MG/DL
COLOR UR: ABNORMAL
GLUCOSE UR STRIP.AUTO-MCNC: NEGATIVE MG/DL
HYALINE CASTS #/AREA URNS AUTO: ABNORMAL /LPF
KETONES UR STRIP.AUTO-MCNC: NEGATIVE MG/DL
LEUKOCYTE ESTERASE UR QL STRIP.AUTO: ABNORMAL
MUCOUS THREADS #/AREA URNS AUTO: ABNORMAL /LPF
NITRITE UR QL STRIP.AUTO: NEGATIVE
PH UR STRIP.AUTO: 7.5 [PH]
PROT UR STRIP.AUTO-MCNC: ABNORMAL MG/DL
RBC # UR STRIP.AUTO: ABNORMAL /UL
RBC #/AREA URNS AUTO: >20 /HPF
SP GR UR STRIP.AUTO: 1.02
UROBILINOGEN UR STRIP.AUTO-MCNC: ABNORMAL MG/DL
WBC #/AREA URNS AUTO: >50 /HPF
WBC CLUMPS #/AREA URNS AUTO: ABNORMAL /HPF

## 2024-10-24 PROCEDURE — 81001 URINALYSIS AUTO W/SCOPE: CPT | Performed by: STUDENT IN AN ORGANIZED HEALTH CARE EDUCATION/TRAINING PROGRAM

## 2024-10-24 PROCEDURE — 87086 URINE CULTURE/COLONY COUNT: CPT | Mod: STJLAB | Performed by: STUDENT IN AN ORGANIZED HEALTH CARE EDUCATION/TRAINING PROGRAM

## 2024-10-24 PROCEDURE — 99284 EMERGENCY DEPT VISIT MOD MDM: CPT | Performed by: STUDENT IN AN ORGANIZED HEALTH CARE EDUCATION/TRAINING PROGRAM

## 2024-10-24 PROCEDURE — 99283 EMERGENCY DEPT VISIT LOW MDM: CPT

## 2024-10-24 PROCEDURE — 2500000001 HC RX 250 WO HCPCS SELF ADMINISTERED DRUGS (ALT 637 FOR MEDICARE OP)

## 2024-10-24 RX ORDER — CEPHALEXIN 500 MG/1
500 CAPSULE ORAL 2 TIMES DAILY
Qty: 14 CAPSULE | Refills: 0 | Status: SHIPPED | OUTPATIENT
Start: 2024-10-24 | End: 2024-10-24

## 2024-10-24 RX ORDER — CEPHALEXIN 500 MG/1
500 CAPSULE ORAL ONCE
Status: COMPLETED | OUTPATIENT
Start: 2024-10-24 | End: 2024-10-24

## 2024-10-24 RX ORDER — CEPHALEXIN 500 MG/1
500 CAPSULE ORAL 2 TIMES DAILY
Qty: 14 CAPSULE | Refills: 0 | Status: SHIPPED | OUTPATIENT
Start: 2024-10-24 | End: 2024-10-31

## 2024-10-24 RX ADMIN — CEPHALEXIN 500 MG: 500 CAPSULE ORAL at 17:40

## 2024-10-24 ASSESSMENT — LIFESTYLE VARIABLES
HAVE YOU EVER FELT YOU SHOULD CUT DOWN ON YOUR DRINKING: NO
HAVE PEOPLE ANNOYED YOU BY CRITICIZING YOUR DRINKING: NO
EVER HAD A DRINK FIRST THING IN THE MORNING TO STEADY YOUR NERVES TO GET RID OF A HANGOVER: NO
EVER FELT BAD OR GUILTY ABOUT YOUR DRINKING: NO
TOTAL SCORE: 0

## 2024-10-24 ASSESSMENT — PAIN - FUNCTIONAL ASSESSMENT: PAIN_FUNCTIONAL_ASSESSMENT: 0-10

## 2024-10-24 ASSESSMENT — PAIN SCALES - GENERAL: PAINLEVEL_OUTOF10: 0 - NO PAIN

## 2024-10-24 NOTE — ED PROVIDER NOTES
Emergency Department Provider Note        History of Present Illness     History provided by: Patient  Limitations to History: None  External Records Reviewed with Brief Summary: None    HPI:  Skylar Parra is a 79 y.o. female who presents from assisted living facility via EMS with complaint of bloody urine.  Patient has a history of stress incontinence, CKD, UTI, hematuria, HTN, DM2, and gastric ulcer.  Patient stated that she noticed blood on her wipe after urinating this morning.  She denies dysuria, changes in urinary frequency, fever, abdominal pain, nausea, vomiting, diarrhea, chest pain, shortness of breath, dizziness, or lightheadedness.      Physical Exam   Triage vitals:  T 36.5 °C (97.7 °F)  HR 78  BP (!) 184/77  RR 20  O2 97 % None (Room air)    General: Awake, alert, in no acute distress  Eyes: Gaze conjugate.  No scleral icterus or injection  HENT: Normo-cephalic, atraumatic. No stridor  CV: Regular rate, regular rhythm. Radial pulses 2+ bilaterally  Resp: Breathing non-labored, speaking in full sentences.  Clear to auscultation bilaterally  GI: Soft, non-distended, non-tender. No rebound or guarding.  : Hematuria, no change in urinary frequency, no pain with urination.  MSK/Extremities: No gross bony deformities. Moving all extremities  Skin: Warm. Appropriate color  Neuro: Alert. Oriented. Face symmetric. Speech is fluent.  Gross strength and sensation intact in b/l UE and LEs  Psych: Appropriate mood and affect    Medical Decision Making & ED Course   Medical Decision Makin y.o. female who presents from assisted living facility via EMS with complaint of bloody urine.  On examination, patient is afebrile, well-appearing, and not in apparent distress.  On differential diagnosis include but not limited to cystitis, UTI, vulvovaginitis, or vaginal bleeding.  Will perform genital exam to rule out vaginal ulcer. Will order urinalysis to rule out UTI.     Urinalysis is positive for urinary  tract infection.  Patient was started on Keflex 500 mg.    The case was discussed with the attending, Dr. BYRD, who saw and evaluated patient at the bedside.  Patient was updated with the diagnostic workup and management plan, and she was amenable to the plan.  Patient was discharged in stable condition with 7-day prescription of Keflex to continue treatment at home.  She was given strict return precautions.      CODE STATUS was discussed with patient, she is full code at this time.  ----      Differential diagnoses considered include but are not limited to:   #Vulva vaginitis  #Cystitis  #Vaginal bleeding  #UTI     Social Determinants of Health which Significantly Impact Care: None identified     EKG Independent Interpretation: EKG not obtained    Independent Result Review and Interpretation: None obtained    Chronic conditions affecting the patient's care: As documented above in MDM    The patient was discussed with the following consultants/services: None    Care Considerations: As documented above in [facility]    ED Course:  ED Course as of 10/24/24 1737   Thu Oct 24, 2024   1609 External genitourinary exam performed with physician assistant Rylie at the bedside for chaperone.  No significant sign of vulvovaginitis.  No blood coming out of the urethral meatus.  No vulval tears or lacerations.  No active vaginal bleeding. [TL]   1609 Patient clinically has reassuring vital signs and clinically on examination does not show any signs of severe anemia.  No pallor.  Urinating spontaneously with no sign of abdominal pain or urinary retention.  She denies any use of blood thinning medication.  Alert and oriented.  Will send for urine sample to check for hemorrhagic cystitis otherwise she will need to have her hematuria workup as an outpatient by urology. [TL]      ED Course User Index  [TL] Osmin Russ DO         Diagnoses as of 10/24/24 1737   Urinary tract infection in female   Hematuria, unspecified type     Disposition    As a result of the work-up, the patient was discharged home.  she was informed of her diagnosis and instructed to come back with any concerns or worsening of condition.  she and was agreeable to the plan as discussed above.  she was given the opportunity to ask questions.  All of the patient's questions were answered.    Procedures   Procedures    This was a shared visit with an ED attending.  The patient was seen and discussed with the ED attending Dr. Russ.    Faustino Olsen MD  Emergency Medicine, PGY-2     Faustino Olsen MD  Resident  10/24/24 0864       Faustino Olsen MD  Resident  10/24/24 3804

## 2024-10-24 NOTE — DISCHARGE INSTRUCTIONS
Thank you for giving us the opportunity to take care of you.  Note that you have been prescribed Keflex 500 mg to take twice daily for 7 days for urinary tract infection.  Please use the prescribed medication as written.  Follow-up with your primary care physician within 48 hours regarding your ED visit.    Please seek immediate medical attention if you experience new onset fever, pelvic pain, abdominal pain, nausea, vomiting, diarrhea, shortness of breath, lightheadedness, or dizziness.

## 2024-10-25 LAB
BACTERIA UR CULT: NORMAL
HOLD SPECIMEN: NORMAL

## 2024-10-28 DIAGNOSIS — E11.40 TYPE 2 DIABETES MELLITUS WITH DIABETIC NEUROPATHY, WITHOUT LONG-TERM CURRENT USE OF INSULIN: ICD-10-CM

## 2024-10-28 RX ORDER — BLOOD SUGAR DIAGNOSTIC
STRIP MISCELLANEOUS
Qty: 200 STRIP | Refills: 0 | Status: SHIPPED | OUTPATIENT
Start: 2024-10-28

## 2025-04-05 DIAGNOSIS — E78.00 HYPERCHOLESTEROLEMIA: Primary | ICD-10-CM

## 2025-04-07 RX ORDER — FENOFIBRIC ACID 45 MG/1
45 CAPSULE, DELAYED RELEASE ORAL DAILY
Qty: 30 CAPSULE | Refills: 0 | Status: SHIPPED | OUTPATIENT
Start: 2025-04-07